# Patient Record
Sex: MALE | Race: WHITE | NOT HISPANIC OR LATINO | Employment: FULL TIME | URBAN - NONMETROPOLITAN AREA
[De-identification: names, ages, dates, MRNs, and addresses within clinical notes are randomized per-mention and may not be internally consistent; named-entity substitution may affect disease eponyms.]

---

## 2017-10-05 ENCOUNTER — TRANSCRIBE ORDERS (OUTPATIENT)
Dept: GENERAL RADIOLOGY | Facility: HOSPITAL | Age: 57
End: 2017-10-05

## 2017-10-05 ENCOUNTER — HOSPITAL ENCOUNTER (OUTPATIENT)
Dept: CT IMAGING | Facility: HOSPITAL | Age: 57
Discharge: HOME | End: 2017-10-05
Admitting: NURSE PRACTITIONER

## 2017-10-05 DIAGNOSIS — R10.9 ABDOMINAL PAIN, UNSPECIFIED ABDOMINAL LOCATION: Primary | ICD-10-CM

## 2017-10-05 DIAGNOSIS — R10.9 ABDOMINAL PAIN, UNSPECIFIED ABDOMINAL LOCATION: ICD-10-CM

## 2017-10-05 LAB — CREAT BLDA-MCNC: 1.2 MG/DL (ref 0.6–1.3)

## 2017-10-05 PROCEDURE — 0 IOPAMIDOL 61 % SOLUTION: Performed by: NURSE PRACTITIONER

## 2017-10-05 PROCEDURE — 74177 CT ABD & PELVIS W/CONTRAST: CPT

## 2017-10-05 PROCEDURE — 82565 ASSAY OF CREATININE: CPT

## 2017-10-05 PROCEDURE — 0 IOHEXOL 300 MG/ML SOLUTION: Performed by: NURSE PRACTITIONER

## 2017-10-05 RX ADMIN — IOHEXOL 50 ML: 300 INJECTION, SOLUTION INTRAVENOUS at 16:30

## 2017-10-05 RX ADMIN — IOPAMIDOL 100 ML: 612 INJECTION, SOLUTION INTRAVENOUS at 16:30

## 2017-10-27 ENCOUNTER — TELEPHONE (OUTPATIENT)
Dept: UROLOGY | Facility: CLINIC | Age: 57
End: 2017-10-27

## 2017-11-02 ENCOUNTER — OFFICE VISIT (OUTPATIENT)
Dept: UROLOGY | Facility: CLINIC | Age: 57
End: 2017-11-02

## 2017-11-02 VITALS — WEIGHT: 210 LBS | TEMPERATURE: 98.7 F | BODY MASS INDEX: 29.4 KG/M2 | HEIGHT: 71 IN

## 2017-11-02 DIAGNOSIS — N28.1 RENAL CYST: Primary | ICD-10-CM

## 2017-11-02 LAB
BILIRUB BLD-MCNC: NEGATIVE MG/DL
CLARITY, POC: CLEAR
COLOR UR: YELLOW
GLUCOSE UR STRIP-MCNC: NEGATIVE MG/DL
KETONES UR QL: NEGATIVE
LEUKOCYTE EST, POC: NEGATIVE
NITRITE UR-MCNC: NEGATIVE MG/ML
PH UR: 5.5 [PH] (ref 5–8)
PROT UR STRIP-MCNC: NEGATIVE MG/DL
RBC # UR STRIP: ABNORMAL /UL
SP GR UR: 1.02 (ref 1–1.03)
UROBILINOGEN UR QL: NORMAL

## 2017-11-02 PROCEDURE — 81003 URINALYSIS AUTO W/O SCOPE: CPT | Performed by: UROLOGY

## 2017-11-02 PROCEDURE — 99203 OFFICE O/P NEW LOW 30 MIN: CPT | Performed by: UROLOGY

## 2017-11-02 RX ORDER — LINACLOTIDE 290 UG/1
CAPSULE, GELATIN COATED ORAL
COMMUNITY
Start: 2017-10-05 | End: 2022-02-07

## 2017-11-02 NOTE — PROGRESS NOTES
"  Mr. Silveira is 57 y.o. male    CHIEF COMPLAINT: I am here because of renal cysts.     HPI  Renal mass  The patient presents with a left renal mass. This was first identified approximately 1 month ago. This was found in the context of an evaluation of an unrelated illness on a CT scan with contrast. This is a new diagnois problem. The onset was unknown. Associated symptoms/signs to consider: no evidence of hematuria, flank pain but he has lower abdominal discomfort with diarrhea.   The following portions of the patient's history were reviewed and updated as appropriate: allergies, current medications, past family history, past medical history, past social history, past surgical history and problem list.    Review of Systems   Constitutional: Negative for chills and fever.   Gastrointestinal: Negative for abdominal pain, anal bleeding and blood in stool.   Genitourinary: Negative for flank pain and hematuria.         Current Outpatient Prescriptions:   •  LINZESS 290 MCG capsule capsule, , Disp: , Rfl:     History reviewed. No pertinent past medical history.    History reviewed. No pertinent surgical history.    Social History     Social History   • Marital status:      Spouse name: N/A   • Number of children: N/A   • Years of education: N/A     Social History Main Topics   • Smoking status: Current Every Day Smoker   • Smokeless tobacco: None   • Alcohol use None   • Drug use: None   • Sexual activity: Not Asked     Other Topics Concern   • None     Social History Narrative       Family History   Problem Relation Age of Onset   • No Known Problems Father    • No Known Problems Mother        Temp 98.7 °F (37.1 °C)  Ht 71\" (180.3 cm)  Wt 210 lb (95.3 kg)  BMI 29.29 kg/m2    Physical Exam  Constitutional: She is oriented to person, place, and time. She appears well-developed and well-nourished. No distress.   HENT:   Head: Normocephalic and atraumatic.   Right Ear: External ear and ear canal normal.   Left " Ear: External ear and ear canal normal.   Nose: No nasal deformity. No epistaxis.   Mouth/Throat: Oropharynx is clear and moist. Mucous membranes are not pale, not dry and not cyanotic. Normal dentition. No oropharyngeal exudate.   Neck: Trachea normal. No tracheal tenderness present. No tracheal deviation present. No thyroid mass and no thyromegaly present.   Pulmonary/Chest: Effort normal. No accessory muscle usage. No respiratory distress. Chest wall is not dull to percussion (No flatness or hyperresonance). She exhibits no mass and no tenderness.   On palpation, no tactile fremitus. All movements are symmetric. No intercostal retraction noted.    Abdominal: Soft. Normal appearance. She exhibits no distension and no mass. There is no hepatosplenomegaly. There is no tenderness. No hernia.   Rectal examination or stool specimen is not indicated.    Musculoskeletal:   Normal gait and station. The spine, ribs, and pelvis are examined. No obvious misalignment or asymmetry. ROM is reasonable for age. No instability. No obvious atrophy, flaccidity or spasticity.    Lymphadenopathy:     She has no cervical adenopathy.        Right: No inguinal adenopathy present.        Left: No inguinal adenopathy present.   Neurological: She is alert and oriented to person, place, and time.   Skin: Skin is warm, dry and intact. No lesion and no rash noted. She is not diaphoretic. No cyanosis. No pallor. Nails show no clubbing.   On palpation, there were no induration, subcutaneous nodules, or tightening   Psychiatric: Her speech is normal and behavior is normal. Judgment and thought content normal. Her mood appears not anxious. Her affect is not labile. She does not exhibit a depressed mood.   Vitals reviewed.      Results for orders placed or performed in visit on 11/02/17   POC Urinalysis Dipstick, Automated   Result Value Ref Range    Color Yellow Yellow, Straw, Dark Yellow, Nelly    Clarity, UA Clear Clear    Glucose, UA Negative  Negative, 1000 mg/dL (3+) mg/dL    Bilirubin Negative Negative    Ketones, UA Negative Negative    Specific Gravity  1.025 1.005 - 1.030    Blood, UA Trace (A) Negative    pH, Urine 5.5 5.0 - 8.0    Protein, POC Negative Negative mg/dL    Urobilinogen, UA Normal Normal    Leukocytes Negative Negative    Nitrite, UA Negative Negative     Independent review of CT scan of the abdomen/pelvis The patient has undergone a CT scan of the abdomen and pelvis With contrast. The images are available for me to review as an independent interpretation for evaluation and management.  Assessment of the renal parenchyma with regards to thickness, scarring, symmetry in appearance and function, presence of masses both pre-and postcontrast, and calcifications are noted.  The collecting system with regard to dilatation, presence of calcifications, and masses were reviewed.  The course and caliber the ureters also noted.  The renal vessels and retroperitoneum is inspected for pathology.  The solid viscera and bowel pattern are briefly reviewed, but will also be inspected by the radiologist. The renal pelvis is inspected.  This study shows A parapelvic renal cyst that measures about 5 cm in size.  It does not appear to obstruct kidney.  Parenchyma appears normal.  This does not enhance..      Assessment and Plan  Diagnoses and all orders for this visit:    Renal cyst  -     POC Urinalysis Dipstick, Automated      Georges Kennedy MD  11/02/17  5:00 PM      Cc: LUIS ALFREDO

## 2018-04-17 ENCOUNTER — HOSPITAL ENCOUNTER (OUTPATIENT)
Dept: ULTRASOUND IMAGING | Facility: HOSPITAL | Age: 58
Discharge: HOME | End: 2018-04-17
Admitting: NURSE PRACTITIONER

## 2018-04-17 ENCOUNTER — LAB (OUTPATIENT)
Dept: LAB | Facility: HOSPITAL | Age: 58
End: 2018-04-17

## 2018-04-17 ENCOUNTER — TRANSCRIBE ORDERS (OUTPATIENT)
Dept: ADMINISTRATIVE | Facility: HOSPITAL | Age: 58
End: 2018-04-17

## 2018-04-17 DIAGNOSIS — R10.10 UPPER ABDOMINAL PAIN: ICD-10-CM

## 2018-04-17 DIAGNOSIS — R10.10 UPPER ABDOMINAL PAIN: Primary | ICD-10-CM

## 2018-04-17 LAB
ALBUMIN SERPL-MCNC: 4 G/DL (ref 3.5–5)
ALBUMIN/GLOB SERPL: 1 G/DL (ref 1.1–2.5)
ALP SERPL-CCNC: 86 U/L (ref 24–120)
ALT SERPL W P-5'-P-CCNC: 21 U/L (ref 0–54)
AMYLASE SERPL-CCNC: 39 U/L (ref 30–110)
ANION GAP SERPL CALCULATED.3IONS-SCNC: 6 MMOL/L (ref 4–13)
AST SERPL-CCNC: 15 U/L (ref 7–45)
AUTO MIXED CELLS #: 1 10*3/MM3 (ref 0.1–2.6)
AUTO MIXED CELLS %: 8.9 % (ref 0.1–24)
BILIRUB SERPL-MCNC: 0.9 MG/DL (ref 0.1–1)
BUN BLD-MCNC: 16 MG/DL (ref 5–21)
BUN/CREAT SERPL: 12.6
CALCIUM SPEC-SCNC: 9.4 MG/DL (ref 8.4–10.4)
CHLORIDE SERPL-SCNC: 105 MMOL/L (ref 98–110)
CO2 SERPL-SCNC: 33 MMOL/L (ref 24–31)
CREAT BLD-MCNC: 1.27 MG/DL (ref 0.5–1.4)
ERYTHROCYTE [DISTWIDTH] IN BLOOD BY AUTOMATED COUNT: 13.1 % (ref 12–15)
GFR SERPL CREATININE-BSD FRML MDRD: 59 ML/MIN/1.73
GLOBULIN UR ELPH-MCNC: 4 GM/DL
GLUCOSE BLD-MCNC: 92 MG/DL (ref 70–100)
HCT VFR BLD AUTO: 44.6 % (ref 40–52)
HGB BLD-MCNC: 15.7 G/DL (ref 14–18)
LYMPHOCYTES # BLD AUTO: 3.2 10*3/MM3 (ref 0.8–7)
LYMPHOCYTES NFR BLD AUTO: 28.6 % (ref 15–45)
MCH RBC QN AUTO: 32 PG (ref 28–32)
MCHC RBC AUTO-ENTMCNC: 35.2 G/DL (ref 33–36)
MCV RBC AUTO: 90.8 FL (ref 82–95)
NEUTROPHILS # BLD AUTO: 6.9 10*3/MM3 (ref 1.5–8.3)
NEUTROPHILS NFR BLD AUTO: 62.5 % (ref 39–78)
PLATELET # BLD AUTO: 279 10*3/MM3 (ref 130–400)
PMV BLD AUTO: 9.8 FL (ref 6–12)
POTASSIUM BLD-SCNC: 4.5 MMOL/L (ref 3.5–5.3)
PROT SERPL-MCNC: 8 G/DL (ref 6.3–8.7)
RBC # BLD AUTO: 4.91 10*6/MM3 (ref 4.2–5.4)
SODIUM BLD-SCNC: 144 MMOL/L (ref 135–145)
WBC NRBC COR # BLD: 11.1 10*3/MM3 (ref 4.8–10.8)

## 2018-04-17 PROCEDURE — 76705 ECHO EXAM OF ABDOMEN: CPT

## 2018-04-17 PROCEDURE — 85025 COMPLETE CBC W/AUTO DIFF WBC: CPT | Performed by: NURSE PRACTITIONER

## 2018-04-17 PROCEDURE — 82150 ASSAY OF AMYLASE: CPT

## 2018-04-17 PROCEDURE — 80053 COMPREHEN METABOLIC PANEL: CPT | Performed by: NURSE PRACTITIONER

## 2018-04-17 PROCEDURE — 36415 COLL VENOUS BLD VENIPUNCTURE: CPT

## 2018-04-17 PROCEDURE — 86677 HELICOBACTER PYLORI ANTIBODY: CPT | Performed by: NURSE PRACTITIONER

## 2018-04-18 LAB
H PYLORI IGA SER IA-ACNC: 15.6 UNITS (ref 0–8.9)
H PYLORI IGG SER IA-ACNC: 6.08 INDEX VALUE (ref 0–0.79)
H PYLORI IGM SER-ACNC: <9 UNITS (ref 0–8.9)

## 2018-05-16 ENCOUNTER — OFFICE VISIT (OUTPATIENT)
Dept: GASTROENTEROLOGY | Age: 58
End: 2018-05-16
Payer: COMMERCIAL

## 2018-05-16 VITALS
WEIGHT: 212.2 LBS | BODY MASS INDEX: 29.71 KG/M2 | DIASTOLIC BLOOD PRESSURE: 78 MMHG | HEIGHT: 71 IN | SYSTOLIC BLOOD PRESSURE: 126 MMHG | OXYGEN SATURATION: 96 % | HEART RATE: 86 BPM

## 2018-05-16 DIAGNOSIS — R10.13 EPIGASTRIC BURNING SENSATION: Primary | ICD-10-CM

## 2018-05-16 DIAGNOSIS — K59.00 CONSTIPATION, UNSPECIFIED CONSTIPATION TYPE: ICD-10-CM

## 2018-05-16 PROCEDURE — 99203 OFFICE O/P NEW LOW 30 MIN: CPT | Performed by: NURSE PRACTITIONER

## 2018-05-16 RX ORDER — PANTOPRAZOLE SODIUM 40 MG/1
40 TABLET, DELAYED RELEASE ORAL DAILY
COMMUNITY

## 2018-05-16 RX ORDER — SUCRALFATE 1 G/1
TABLET ORAL
COMMUNITY
Start: 2018-04-17

## 2018-05-16 ASSESSMENT — ENCOUNTER SYMPTOMS
TROUBLE SWALLOWING: 0
BLOOD IN STOOL: 0
COUGH: 0
ABDOMINAL DISTENTION: 0
SHORTNESS OF BREATH: 0
VOICE CHANGE: 0
ABDOMINAL PAIN: 0
CONSTIPATION: 1
RECTAL PAIN: 0
NAUSEA: 0
SORE THROAT: 0
ANAL BLEEDING: 0
VOMITING: 0
DIARRHEA: 0
BACK PAIN: 0

## 2018-07-02 ENCOUNTER — ANESTHESIA EVENT (OUTPATIENT)
Dept: OPERATING ROOM | Age: 58
End: 2018-07-02

## 2018-07-02 ENCOUNTER — ANESTHESIA (OUTPATIENT)
Dept: OPERATING ROOM | Age: 58
End: 2018-07-02

## 2018-07-02 ENCOUNTER — HOSPITAL ENCOUNTER (OUTPATIENT)
Age: 58
Setting detail: SPECIMEN
Discharge: HOME | End: 2018-07-02
Payer: COMMERCIAL

## 2018-07-02 ENCOUNTER — HOSPITAL ENCOUNTER (OUTPATIENT)
Age: 58
Setting detail: OUTPATIENT SURGERY
Discharge: HOME | End: 2018-07-02
Attending: INTERNAL MEDICINE | Admitting: INTERNAL MEDICINE
Payer: COMMERCIAL

## 2018-07-02 VITALS
WEIGHT: 212 LBS | OXYGEN SATURATION: 95 % | BODY MASS INDEX: 29.68 KG/M2 | RESPIRATION RATE: 18 BRPM | HEIGHT: 71 IN | DIASTOLIC BLOOD PRESSURE: 84 MMHG | SYSTOLIC BLOOD PRESSURE: 142 MMHG | HEART RATE: 73 BPM

## 2018-07-02 VITALS — SYSTOLIC BLOOD PRESSURE: 135 MMHG | OXYGEN SATURATION: 95 % | DIASTOLIC BLOOD PRESSURE: 91 MMHG

## 2018-07-02 PROCEDURE — 87077 CULTURE AEROBIC IDENTIFY: CPT

## 2018-07-02 PROCEDURE — 88305 TISSUE EXAM BY PATHOLOGIST: CPT

## 2018-07-02 PROCEDURE — 45380 COLONOSCOPY AND BIOPSY: CPT

## 2018-07-02 PROCEDURE — G8918 PT W/O PREOP ORDER IV AB PRO: HCPCS

## 2018-07-02 PROCEDURE — 43239 EGD BIOPSY SINGLE/MULTIPLE: CPT

## 2018-07-02 PROCEDURE — 45380 COLONOSCOPY AND BIOPSY: CPT | Performed by: INTERNAL MEDICINE

## 2018-07-02 PROCEDURE — G8907 PT DOC NO EVENTS ON DISCHARG: HCPCS

## 2018-07-02 PROCEDURE — 43239 EGD BIOPSY SINGLE/MULTIPLE: CPT | Performed by: INTERNAL MEDICINE

## 2018-07-02 RX ORDER — PROPOFOL 10 MG/ML
INJECTION, EMULSION INTRAVENOUS PRN
Status: DISCONTINUED | OUTPATIENT
Start: 2018-07-02 | End: 2018-07-02 | Stop reason: SDUPTHER

## 2018-07-02 RX ORDER — LIDOCAINE HYDROCHLORIDE 10 MG/ML
1 INJECTION, SOLUTION EPIDURAL; INFILTRATION; INTRACAUDAL; PERINEURAL
Status: DISCONTINUED | OUTPATIENT
Start: 2018-07-02 | End: 2018-07-02 | Stop reason: HOSPADM

## 2018-07-02 RX ORDER — SODIUM CHLORIDE 9 MG/ML
INJECTION, SOLUTION INTRAVENOUS CONTINUOUS
Status: DISCONTINUED | OUTPATIENT
Start: 2018-07-02 | End: 2018-07-02 | Stop reason: HOSPADM

## 2018-07-02 RX ADMIN — PROPOFOL 300 MG: 10 INJECTION, EMULSION INTRAVENOUS at 11:25

## 2018-07-02 RX ADMIN — SODIUM CHLORIDE: 9 INJECTION, SOLUTION INTRAVENOUS at 11:09

## 2018-07-02 NOTE — OP NOTE
Post Procedure Note    Name of surgeon / : Luann Miner DO    Date of Service: 07/02/18    Withdrawal Time: >6min    Prep Quality: good     Pre-operative Diagnosis:   Active Hospital Problems    Diagnosis Date Noted    Epigastric burning sensation [R10.13] 05/16/2018    Constipation [K59.00] 05/16/2018       Post-operative Diagnosis/Findings: colon polyps    Procedure: Procedure(s):  COLONOSCOPY WITH cold BIOPSY polypectomy  EGD BIOPSY    Anesthesia: Monitor Anesthesia Care    Surgeons/Assistants: Luann Miner DO    Referring Physician: CASEY Doss -*    Procedure Note:  After informed consent was obtained, the patient was placed in the left lateral decubitus position and sedated per MAC. A rectal exam was done which was normal.  The colonoscope was inserted into the rectum and retroflexed which was normal.  The colonoscope was then advanced to the cecum under direct visualization. The appendiceal orifice and ileocecal valve were identified. The colonoscope was withdrawn. A polyp was seen in the sigmoid. It was dimunitive in size. It was removed via removed by cold biopsy and sent for pathology. No abnormalities were discovered. The colonoscope was completely withdrawn. The patient tolerated the procedure. The patient was then positioned for an upper endoscopy. The endoscope was advanced down the oropharynx, down the esophagus, through the gastric lumen, into the duodenum. The small bowel appeared normal.  The gastric antrum was normal.  Antral biopsies were obtained for h. Pylori. The gastric body was normal.  Retroflexion was done which showed a normal fundus. The scope was withdrawn. The GE junction was at 44 cm. It was slightly jagged and irregular. Biopsies were obtained. The remaining esophagus was normal.  The scope was withdrawn.         Estimated Blood Loss: Minimal    Complications: None    Specimens:   ID Type Source Tests Collected by Time Destination   1 : ANT TEST Tissue Stomach ANT TEST Benjamin Diallo, DO 7/2/2018 1141    A : sigmoid colon polyp x2 Tissue Sigmoid Colon SURGICAL PATHOLOGY Benjamin Diallo, DO 7/2/2018 1137    B : distal esophagus bx ro barretts Tissue Esophagus SURGICAL PATHOLOGY Benjamin Diallo, DO 7/2/2018 1141        Discussion: The patient had colon polyps. Repeat colonoscopy in 5 years. If he is positive for h. Pylori, we will treat. Ok to continue linzess and protonix. Ok to stop carafate and only take as needed.     Benjamin Diallo, DO  07/02/18  11:45 AM

## 2018-07-02 NOTE — ANESTHESIA POSTPROCEDURE EVALUATION
Department of Anesthesiology  Postprocedure Note    Patient: Ronnie Eastman  MRN: [id number]  YOB: 1960  Date of evaluation: 7/2/2018  Time:  11:42 AM     Procedure Summary     Date:  07/02/18 Room / Location:  [facility] ENDO 01 / [facility] OR    Anesthesia Start:  0901 Anesthesia Stop:      Procedures:       COLONOSCOPY WITH BIOPSY (N/A )      EGD BIOPSY (N/A ) Diagnosis:  (NAUSEA - BLOATING - UPPER ABD BURNING - CONSTIPATION )    Surgeon:  Katerine Lovett DO Responsible Provider: CASEY Kong CRNA    Anesthesia Type:  MAC ASA Status:  2          Anesthesia Type: MAC    Dwayne Phase I:      Dwayne Phase II:      Last vitals: Reviewed and per EMR flowsheets.        Anesthesia Post Evaluation    Patient location during evaluation: bedside  Patient participation: complete - patient participated  Level of consciousness: sleepy but conscious  Airway patency: patent  Nausea & Vomiting: no nausea and no vomiting  Complications: no  Cardiovascular status: hemodynamically stable  Respiratory status: room air and spontaneous ventilation  Hydration status: euvolemic

## 2018-07-02 NOTE — H&P
Patient Name: Qi Lu  : 1960  MRN: [id number]    Allergies: No Known Allergies      ENDOSCOPY / COLONOSCOPY / BRONCHOSCOPY      PRE-SEDATION ASSESSMENT      Procedure:    [x] Colonoscopy     [x] Endoscopy      [] ERCP      [] Bronchoscopy      [] Other  [] History and Physical completed in chart for Inpatient or within 30 daysfrom office. I have examined the patient's status immediately prior to the procedure and:    [x] No interval change in patient status since H&P completed  [] Interval change in patient status (explained below)           BRIEF H&P    HPI/changes/indicators/diagnosis  Active Hospital Problems    Diagnosis Date Noted    Epigastric burning sensation [R10.13] 2018    Constipation [K59.00] 2018       Medications:   Prior to Admission medications    Medication Sig Start Date End Date Taking? Authorizing Provider   sucralfate (CARAFATE) 1 GM tablet  18   Historical Provider, MD   linaclotide [facility]) 290 MCG CAPS capsule  10/5/17   Historical Provider, MD   pantoprazole (PROTONIX) 40 MG tablet Take 40 mg by mouth daily    Historical Provider, MD       Allergies:   has No Known Allergies. Vital Signs:   Vitals:    18 1057   BP: (!) 171/102   Pulse: 79   Resp: 18   SpO2: 98%       ROS:  Cardiac:  [x]WNL  []Comments:  Pulmonary:  [x]WNL   []Comments:  Neuro/Mental Status:  [x]WNL  []Comments:  Abdominal:                   Active Hospital Problems    Diagnosis Date Noted    Epigastric burning sensation [R10.13] 2018    Constipation [K59.00] 2018        All other pertinent GI symptoms negative.     Physical Exam:  Cardiac:  [x]WNL  []Comments:  Pulmonary:  [x]WNL   []Comments:  Neuro/Mental Status:  [x]WNL  []Comments:  Abdominal:  [x]WNL    []Comments:  Other:   []WNL  []Comments:    Informed Consent:  The risks and benefits of the procedure have been discussed with either the patient or if they cannot consent, their

## 2018-07-03 LAB — CLOTEST: NEGATIVE

## 2018-07-17 ENCOUNTER — TELEPHONE (OUTPATIENT)
Dept: GASTROENTEROLOGY | Age: 58
End: 2018-07-17

## 2019-05-24 ENCOUNTER — TELEPHONE (OUTPATIENT)
Dept: FAMILY MEDICINE CLINIC | Facility: CLINIC | Age: 59
End: 2019-05-24

## 2022-02-07 ENCOUNTER — LAB (OUTPATIENT)
Dept: LAB | Facility: HOSPITAL | Age: 62
End: 2022-02-07

## 2022-02-07 ENCOUNTER — OFFICE VISIT (OUTPATIENT)
Dept: FAMILY MEDICINE CLINIC | Facility: CLINIC | Age: 62
End: 2022-02-07

## 2022-02-07 ENCOUNTER — APPOINTMENT (OUTPATIENT)
Dept: CT IMAGING | Facility: HOSPITAL | Age: 62
End: 2022-02-07

## 2022-02-07 ENCOUNTER — HOSPITAL ENCOUNTER (EMERGENCY)
Facility: HOSPITAL | Age: 62
Discharge: HOME | End: 2022-02-07
Admitting: STUDENT IN AN ORGANIZED HEALTH CARE EDUCATION/TRAINING PROGRAM

## 2022-02-07 ENCOUNTER — APPOINTMENT (OUTPATIENT)
Dept: GENERAL RADIOLOGY | Facility: HOSPITAL | Age: 62
End: 2022-02-07

## 2022-02-07 ENCOUNTER — HOSPITAL ENCOUNTER (OUTPATIENT)
Dept: CT IMAGING | Facility: HOSPITAL | Age: 62
Discharge: HOME | End: 2022-02-07

## 2022-02-07 ENCOUNTER — TELEPHONE (OUTPATIENT)
Dept: FAMILY MEDICINE CLINIC | Facility: CLINIC | Age: 62
End: 2022-02-07

## 2022-02-07 VITALS
HEART RATE: 99 BPM | RESPIRATION RATE: 22 BRPM | WEIGHT: 216 LBS | DIASTOLIC BLOOD PRESSURE: 78 MMHG | BODY MASS INDEX: 30.24 KG/M2 | TEMPERATURE: 98 F | SYSTOLIC BLOOD PRESSURE: 128 MMHG | OXYGEN SATURATION: 94 % | HEIGHT: 71 IN

## 2022-02-07 VITALS
BODY MASS INDEX: 30.24 KG/M2 | WEIGHT: 216 LBS | DIASTOLIC BLOOD PRESSURE: 94 MMHG | TEMPERATURE: 99.6 F | OXYGEN SATURATION: 96 % | HEART RATE: 103 BPM | RESPIRATION RATE: 16 BRPM | SYSTOLIC BLOOD PRESSURE: 167 MMHG | HEIGHT: 71 IN

## 2022-02-07 DIAGNOSIS — R35.0 URINARY FREQUENCY: ICD-10-CM

## 2022-02-07 DIAGNOSIS — R10.9 LEFT FLANK PAIN: ICD-10-CM

## 2022-02-07 DIAGNOSIS — R39.15 URINARY URGENCY: ICD-10-CM

## 2022-02-07 DIAGNOSIS — R93.89 ABNORMAL CT SCAN: ICD-10-CM

## 2022-02-07 DIAGNOSIS — U07.1 COVID-19: Primary | ICD-10-CM

## 2022-02-07 DIAGNOSIS — R74.8 ELEVATED LIVER ENZYMES: ICD-10-CM

## 2022-02-07 DIAGNOSIS — J12.82 PNEUMONIA DUE TO COVID-19 VIRUS: ICD-10-CM

## 2022-02-07 DIAGNOSIS — U07.1 PNEUMONIA DUE TO COVID-19 VIRUS: ICD-10-CM

## 2022-02-07 DIAGNOSIS — E87.1 HYPONATREMIA: ICD-10-CM

## 2022-02-07 DIAGNOSIS — R10.9 LEFT FLANK PAIN: Primary | ICD-10-CM

## 2022-02-07 DIAGNOSIS — E83.51 HYPOCALCEMIA: ICD-10-CM

## 2022-02-07 DIAGNOSIS — N28.1 RENAL CYST, LEFT: ICD-10-CM

## 2022-02-07 LAB
ALBUMIN SERPL-MCNC: 3.5 G/DL (ref 3.5–5.2)
ALBUMIN SERPL-MCNC: 4 G/DL (ref 3.5–5)
ALBUMIN/GLOB SERPL: 0.9 G/DL
ALBUMIN/GLOB SERPL: 1 G/DL (ref 1.1–2.5)
ALP SERPL-CCNC: 79 U/L (ref 24–120)
ALP SERPL-CCNC: 80 U/L (ref 39–117)
ALT SERPL W P-5'-P-CCNC: 52 U/L (ref 1–41)
ALT SERPL W P-5'-P-CCNC: 62 U/L (ref 0–50)
AMYLASE SERPL-CCNC: 36 U/L (ref 30–110)
ANION GAP SERPL CALCULATED.3IONS-SCNC: 11 MMOL/L (ref 5–15)
ANION GAP SERPL CALCULATED.3IONS-SCNC: 7 MMOL/L (ref 4–13)
ARTERIAL PATENCY WRIST A: ABNORMAL
AST SERPL-CCNC: 70 U/L (ref 1–40)
AST SERPL-CCNC: 84 U/L (ref 7–45)
ATMOSPHERIC PRESS: 757 MMHG
AUTO MIXED CELLS #: 1.2 10*3/MM3 (ref 0.1–2.6)
AUTO MIXED CELLS %: 17 % (ref 0.1–24)
BACTERIA UR QL AUTO: ABNORMAL /HPF
BASE EXCESS BLDA CALC-SCNC: 1.6 MMOL/L (ref 0–2)
BDY SITE: ABNORMAL
BILIRUB SERPL-MCNC: 0.9 MG/DL (ref 0–1.2)
BILIRUB SERPL-MCNC: 1.3 MG/DL (ref 0.1–1)
BILIRUB UR QL STRIP: NEGATIVE
BODY TEMPERATURE: 37 C
BUN SERPL-MCNC: 10 MG/DL (ref 5–21)
BUN SERPL-MCNC: 10 MG/DL (ref 6–20)
BUN/CREAT SERPL: 10.2 (ref 7–25)
BUN/CREAT SERPL: 9.5
BURR CELLS BLD QL SMEAR: ABNORMAL
CALCIUM SPEC-SCNC: 8.3 MG/DL (ref 8.4–10.4)
CALCIUM SPEC-SCNC: 8.4 MG/DL (ref 8.6–10.5)
CHLORIDE SERPL-SCNC: 95 MMOL/L (ref 98–107)
CHLORIDE SERPL-SCNC: 95 MMOL/L (ref 98–110)
CLARITY UR: CLEAR
CO2 SERPL-SCNC: 26 MMOL/L (ref 22–29)
CO2 SERPL-SCNC: 27 MMOL/L (ref 24–31)
COLOR UR: YELLOW
CREAT SERPL-MCNC: 0.98 MG/DL (ref 0.76–1.27)
CREAT SERPL-MCNC: 1.05 MG/DL (ref 0.5–1.4)
CRP SERPL-MCNC: 12.21 MG/DL (ref 0–0.5)
CRP SERPL-MCNC: 12.28 MG/DL (ref 0–0.5)
D DIMER PPP FEU-MCNC: 0.84 MG/L (FEU) (ref 0–0.5)
D-LACTATE SERPL-SCNC: 1 MMOL/L (ref 0.5–2)
DEPRECATED RDW RBC AUTO: 40.4 FL (ref 37–54)
ERYTHROCYTE [DISTWIDTH] IN BLOOD BY AUTOMATED COUNT: 12.6 % (ref 12.3–15.4)
ERYTHROCYTE [DISTWIDTH] IN BLOOD BY AUTOMATED COUNT: 12.7 % (ref 12.3–15.4)
ERYTHROCYTE [SEDIMENTATION RATE] IN BLOOD: 15 MM/HR (ref 0–20)
GFR SERPL CREATININE-BSD FRML MDRD: 72 ML/MIN/1.73
GFR SERPL CREATININE-BSD FRML MDRD: 78 ML/MIN/1.73
GIANT PLATELETS: ABNORMAL
GLOBULIN UR ELPH-MCNC: 3.9 GM/DL
GLOBULIN UR ELPH-MCNC: 4.1 GM/DL
GLUCOSE SERPL-MCNC: 107 MG/DL (ref 65–99)
GLUCOSE SERPL-MCNC: 108 MG/DL (ref 70–100)
GLUCOSE UR STRIP-MCNC: NEGATIVE MG/DL
HCO3 BLDA-SCNC: 24.2 MMOL/L (ref 20–26)
HCT VFR BLD AUTO: 43.4 % (ref 37.5–51)
HCT VFR BLD AUTO: 43.9 % (ref 37.5–51)
HGB BLD-MCNC: 14.9 G/DL (ref 13–17.7)
HGB BLD-MCNC: 15.3 G/DL (ref 13–17.7)
HGB UR QL STRIP.AUTO: ABNORMAL
HOLD SPECIMEN: NORMAL
HYALINE CASTS UR QL AUTO: ABNORMAL /LPF
INHALED O2 CONCENTRATION: 21 %
KETONES UR QL STRIP: ABNORMAL
LDH SERPL-CCNC: 484 U/L (ref 135–225)
LEUKOCYTE ESTERASE UR QL STRIP.AUTO: NEGATIVE
LIPASE SERPL-CCNC: 45 U/L (ref 23–203)
LYMPHOCYTES # BLD AUTO: 1.1 10*3/MM3 (ref 0.7–3.1)
LYMPHOCYTES # BLD MANUAL: 0.59 10*3/MM3 (ref 0.7–3.1)
LYMPHOCYTES NFR BLD AUTO: 15.6 % (ref 19.6–45.3)
LYMPHOCYTES NFR BLD MANUAL: 11 % (ref 5–12)
Lab: ABNORMAL
MCH RBC QN AUTO: 30 PG (ref 26.6–33)
MCH RBC QN AUTO: 30.6 PG (ref 26.6–33)
MCHC RBC AUTO-ENTMCNC: 34.3 G/DL (ref 31.5–35.7)
MCHC RBC AUTO-ENTMCNC: 34.9 G/DL (ref 31.5–35.7)
MCV RBC AUTO: 87.3 FL (ref 79–97)
MCV RBC AUTO: 87.8 FL (ref 79–97)
METAMYELOCYTES NFR BLD MANUAL: 2 % (ref 0–0)
MODALITY: ABNORMAL
MONOCYTES # BLD: 0.72 10*3/MM3 (ref 0.1–0.9)
NEUTROPHILS # BLD AUTO: 5.12 10*3/MM3 (ref 1.7–7)
NEUTROPHILS NFR BLD AUTO: 4.7 10*3/MM3 (ref 1.7–7)
NEUTROPHILS NFR BLD AUTO: 67.4 % (ref 42.7–76)
NEUTROPHILS NFR BLD MANUAL: 78 % (ref 42.7–76)
NITRITE UR QL STRIP: NEGATIVE
PCO2 BLDA: 31.8 MM HG (ref 35–45)
PCO2 TEMP ADJ BLD: 31.8 MM HG (ref 35–45)
PH BLDA: 7.49 PH UNITS (ref 7.35–7.45)
PH UR STRIP.AUTO: 6 [PH] (ref 5–8)
PH, TEMP CORRECTED: 7.49 PH UNITS (ref 7.35–7.45)
PLATELET # BLD AUTO: 202 10*3/MM3 (ref 140–450)
PLATELET # BLD AUTO: 218 10*3/MM3 (ref 140–450)
PMV BLD AUTO: 10.8 FL (ref 6–12)
PMV BLD AUTO: 9.6 FL (ref 6–12)
PO2 BLDA: 59 MM HG (ref 83–108)
PO2 TEMP ADJ BLD: 59 MM HG (ref 83–108)
POIKILOCYTOSIS BLD QL SMEAR: ABNORMAL
POLYCHROMASIA BLD QL SMEAR: ABNORMAL
POTASSIUM SERPL-SCNC: 3.6 MMOL/L (ref 3.5–5.2)
POTASSIUM SERPL-SCNC: 3.7 MMOL/L (ref 3.5–5.3)
PROCALCITONIN SERPL-MCNC: 0.1 NG/ML (ref 0–0.25)
PROT SERPL-MCNC: 7.6 G/DL (ref 6–8.5)
PROT SERPL-MCNC: 7.9 G/DL (ref 6.3–8.7)
PROT UR QL STRIP: ABNORMAL
RBC # BLD AUTO: 4.97 10*6/MM3 (ref 4.14–5.8)
RBC # BLD AUTO: 5 10*6/MM3 (ref 4.14–5.8)
RBC # UR STRIP: ABNORMAL /HPF
REF LAB TEST METHOD: ABNORMAL
SAO2 % BLDCOA: 92.6 % (ref 94–99)
SARS-COV-2 RNA PNL SPEC NAA+PROBE: DETECTED
SODIUM SERPL-SCNC: 129 MMOL/L (ref 135–145)
SODIUM SERPL-SCNC: 132 MMOL/L (ref 136–145)
SP GR UR STRIP: 1.01 (ref 1–1.03)
SQUAMOUS #/AREA URNS HPF: ABNORMAL /HPF
UROBILINOGEN UR QL STRIP: ABNORMAL
VARIANT LYMPHS NFR BLD MANUAL: 4 % (ref 0–5)
VARIANT LYMPHS NFR BLD MANUAL: 5 % (ref 19.6–45.3)
VENTILATOR MODE: ABNORMAL
WBC # UR STRIP: ABNORMAL /HPF
WBC MORPH BLD: NORMAL
WBC NRBC COR # BLD: 6.57 10*3/MM3 (ref 3.4–10.8)
WBC NRBC COR # BLD: 7 10*3/MM3 (ref 3.4–10.8)

## 2022-02-07 PROCEDURE — 80053 COMPREHEN METABOLIC PANEL: CPT | Performed by: PHYSICIAN ASSISTANT

## 2022-02-07 PROCEDURE — 85025 COMPLETE CBC W/AUTO DIFF WBC: CPT | Performed by: PHYSICIAN ASSISTANT

## 2022-02-07 PROCEDURE — 86140 C-REACTIVE PROTEIN: CPT | Performed by: PHYSICIAN ASSISTANT

## 2022-02-07 PROCEDURE — 36600 WITHDRAWAL OF ARTERIAL BLOOD: CPT

## 2022-02-07 PROCEDURE — 83615 LACTATE (LD) (LDH) ENZYME: CPT | Performed by: PHYSICIAN ASSISTANT

## 2022-02-07 PROCEDURE — 82150 ASSAY OF AMYLASE: CPT

## 2022-02-07 PROCEDURE — 81001 URINALYSIS AUTO W/SCOPE: CPT | Performed by: NURSE PRACTITIONER

## 2022-02-07 PROCEDURE — 36415 COLL VENOUS BLD VENIPUNCTURE: CPT

## 2022-02-07 PROCEDURE — 71275 CT ANGIOGRAPHY CHEST: CPT

## 2022-02-07 PROCEDURE — 85652 RBC SED RATE AUTOMATED: CPT

## 2022-02-07 PROCEDURE — 86140 C-REACTIVE PROTEIN: CPT

## 2022-02-07 PROCEDURE — 85007 BL SMEAR W/DIFF WBC COUNT: CPT | Performed by: PHYSICIAN ASSISTANT

## 2022-02-07 PROCEDURE — 84145 PROCALCITONIN (PCT): CPT | Performed by: PHYSICIAN ASSISTANT

## 2022-02-07 PROCEDURE — 85379 FIBRIN DEGRADATION QUANT: CPT | Performed by: PHYSICIAN ASSISTANT

## 2022-02-07 PROCEDURE — 83690 ASSAY OF LIPASE: CPT

## 2022-02-07 PROCEDURE — 85025 COMPLETE CBC W/AUTO DIFF WBC: CPT | Performed by: NURSE PRACTITIONER

## 2022-02-07 PROCEDURE — 71045 X-RAY EXAM CHEST 1 VIEW: CPT

## 2022-02-07 PROCEDURE — 80053 COMPREHEN METABOLIC PANEL: CPT | Performed by: NURSE PRACTITIONER

## 2022-02-07 PROCEDURE — 0 IOPAMIDOL PER 1 ML: Performed by: PHYSICIAN ASSISTANT

## 2022-02-07 PROCEDURE — 83605 ASSAY OF LACTIC ACID: CPT | Performed by: PHYSICIAN ASSISTANT

## 2022-02-07 PROCEDURE — 99205 OFFICE O/P NEW HI 60 MIN: CPT | Performed by: NURSE PRACTITIONER

## 2022-02-07 PROCEDURE — 99284 EMERGENCY DEPT VISIT MOD MDM: CPT

## 2022-02-07 PROCEDURE — 96374 THER/PROPH/DIAG INJ IV PUSH: CPT

## 2022-02-07 PROCEDURE — 25010000002 ONDANSETRON PER 1 MG: Performed by: PHYSICIAN ASSISTANT

## 2022-02-07 PROCEDURE — 87635 SARS-COV-2 COVID-19 AMP PRB: CPT | Performed by: NURSE PRACTITIONER

## 2022-02-07 PROCEDURE — 74176 CT ABD & PELVIS W/O CONTRAST: CPT

## 2022-02-07 PROCEDURE — 82803 BLOOD GASES ANY COMBINATION: CPT

## 2022-02-07 RX ORDER — AZITHROMYCIN 250 MG/1
TABLET, FILM COATED ORAL
Qty: 6 TABLET | Refills: 0 | Status: SHIPPED | OUTPATIENT
Start: 2022-02-07

## 2022-02-07 RX ORDER — ONDANSETRON 2 MG/ML
4 INJECTION INTRAMUSCULAR; INTRAVENOUS ONCE
Status: COMPLETED | OUTPATIENT
Start: 2022-02-07 | End: 2022-02-07

## 2022-02-07 RX ORDER — BROMPHENIRAMINE MALEATE, PSEUDOEPHEDRINE HYDROCHLORIDE, AND DEXTROMETHORPHAN HYDROBROMIDE 2; 30; 10 MG/5ML; MG/5ML; MG/5ML
5 SYRUP ORAL 4 TIMES DAILY PRN
Qty: 118 ML | Refills: 0 | Status: SHIPPED | OUTPATIENT
Start: 2022-02-07

## 2022-02-07 RX ORDER — SODIUM CHLORIDE 0.9 % (FLUSH) 0.9 %
10 SYRINGE (ML) INJECTION AS NEEDED
Status: DISCONTINUED | OUTPATIENT
Start: 2022-02-07 | End: 2022-02-07 | Stop reason: HOSPADM

## 2022-02-07 RX ORDER — ACETAMINOPHEN 500 MG
1000 TABLET ORAL ONCE
Status: COMPLETED | OUTPATIENT
Start: 2022-02-07 | End: 2022-02-07

## 2022-02-07 RX ORDER — METHYLPREDNISOLONE 4 MG/1
TABLET ORAL
Qty: 21 EACH | Refills: 0 | Status: SHIPPED | OUTPATIENT
Start: 2022-02-07

## 2022-02-07 RX ADMIN — ONDANSETRON 4 MG: 2 INJECTION INTRAMUSCULAR; INTRAVENOUS at 16:47

## 2022-02-07 RX ADMIN — SODIUM CHLORIDE 1000 ML: 9 INJECTION, SOLUTION INTRAVENOUS at 16:46

## 2022-02-07 RX ADMIN — ACETAMINOPHEN 1000 MG: 500 TABLET ORAL at 16:48

## 2022-02-07 RX ADMIN — IOPAMIDOL 100 ML: 755 INJECTION, SOLUTION INTRAVENOUS at 18:29

## 2022-02-07 NOTE — PROGRESS NOTES
"Chief Complaint  Back Pain (He states he has pain in his left lower back for about 2 days.  He states he thinks he has a UTI.  He states he has frequency but very little urination.)    Subjective    History of Present Illness      Patient presents to [facility] for    Back Pain.  He states he has pain in his left lower back for about 2 days. He states he thinks he has a UTI. He states he has frequency but very little urination        Review of Systems   Genitourinary: Positive for difficulty urinating and frequency.   Musculoskeletal: Positive for back pain.       I have reviewed and agree with the HPI and ROS information as above.  Manjula Mead, DAIANA     Objective   Vital Signs:   /94 (BP Location: Right arm, Patient Position: Sitting)   Pulse 103   Temp 99.6 °F (37.6 °C)   Resp 16   Ht 180.3 cm (71\")   Wt 98 kg (216 lb)   SpO2 96%   BMI 30.13 kg/m²       Physical Exam  Constitutional:       Appearance: Normal appearance. He is well-developed.   HENT:      Head: Normocephalic and atraumatic.      Right Ear: External ear normal.      Left Ear: External ear normal.      Nose: Nose normal. No nasal tenderness or congestion.      Mouth/Throat:      Lips: Pink. No lesions.      Mouth: Mucous membranes are moist. No oral lesions.      Dentition: Normal dentition.      Pharynx: Oropharynx is clear. No pharyngeal swelling, oropharyngeal exudate or posterior oropharyngeal erythema.   Eyes:      General: Lids are normal. Vision grossly intact. No scleral icterus.        Right eye: No discharge.         Left eye: No discharge.      Extraocular Movements: Extraocular movements intact.      Conjunctiva/sclera: Conjunctivae normal.      Right eye: Right conjunctiva is not injected.      Left eye: Left conjunctiva is not injected.      Pupils: Pupils are equal, round, and reactive to light.   Cardiovascular:      Rate and Rhythm: Normal rate and regular rhythm.      Heart " sounds: Normal heart sounds. No murmur heard.  No gallop.    Pulmonary:      Effort: Pulmonary effort is normal.      Breath sounds: Normal breath sounds and air entry. No wheezing, rhonchi or rales.   Abdominal:      General: Bowel sounds are normal.      Palpations: Abdomen is soft.      Tenderness: There is left CVA tenderness.   Musculoskeletal:         General: No tenderness or deformity. Normal range of motion.      Cervical back: Full passive range of motion without pain, normal range of motion and neck supple.      Right lower leg: No edema.      Left lower leg: No edema.   Skin:     General: Skin is warm and dry.      Coloration: Skin is not jaundiced.      Findings: No rash.   Neurological:      Mental Status: He is alert and oriented to person, place, and time.      Cranial Nerves: Cranial nerves are intact.      Sensory: Sensation is intact.      Motor: Motor function is intact.      Coordination: Coordination is intact.      Gait: Gait is intact.   Psychiatric:         Attention and Perception: Attention normal.         Mood and Affect: Mood and affect normal.         Behavior: Behavior is not hyperactive. Behavior is cooperative.         Thought Content: Thought content normal.         Judgment: Judgment normal.          Result Review  Data Reviewed:                   Assessment and Plan      Problem List Items Addressed This Visit     None      Visit Diagnoses     Left flank pain    -  Primary    Relevant Orders    CT Abdomen Pelvis Without Contrast (Completed)    Amylase (Completed)    Lipase (Completed)    CBC & Differential (Completed)    Comprehensive Metabolic Panel (Completed)    C-reactive Protein    Sedimentation Rate (Completed)    Urinalysis With Culture If Indicated - Urine, Clean Catch (Completed)    Urinalysis, Microscopic Only - Urine, Clean Catch (Completed)    Urinary urgency        Urinary frequency        Abnormal CT scan        Relevant Orders    COVID PRE-OP / PRE-PROCEDURE  SCREENING ORDER (NO ISOLATION) - Swab, Nasal Cavity    Hyponatremia        Hypocalcemia        Elevated liver enzymes        Renal cyst, left          Patient comes in today complaining of left lower back pain as well as urinary urgency and urinary frequency.  This has been going on for 2 to 3 days.  He denies a history of kidney stones.  He states he is having normal bowel movements.  Denies noticing any blood in his urine.  I did notice in his chart that he has a history of following with urology it looks like on one visit regarding a renal cyst.  He has not followed up since that visit in 2017.  Patient states he was not told to follow-up in regards to this at that time.    We will proceed with a CT today as well as a urinalysis and blood work.  Pending those results will determine further work-up.    Patient CT shows subtle new subpleural groundglass opacities in the lung bases as well as bibasilar atelectasis and emphysematous changes.  Radiologist recommended correlation with COVID-19 testing so I Bob was collected for that.  Also mild dilation of collecting structure to the left kidney was noted as well as a stable 5.9 cm cyst.    Amylase, lipase, and sed rate were all normal.  CRP is pending.  Urine showed trace ketones and trace protein as well as small blood and a 4 urobilinogen.  CBC okay.  CMP showed a low sodium of 129.  Also show calcium that was mildly low at 8.3.  ALT and AST both elevated.    When I went in to discuss all of the above results with patient he was still in considerable pain.  He is one that would not go to the doctor on a regular basis so he is obviously very uncomfortable today.  I am concerned with the unsure nature of his pain as well as his low sodium level that if he were to go home without further evaluation and treatment at this time that he would get worse.  I called report to a PA at the ER.  Patient wife is going to take patient by private vehicle.  I did educate the  patient as well as the PA that we do have a pending Covid test that should be available in about 30 minutes.        Follow Up   Return if symptoms worsen or fail to improve.  Patient was given instructions and counseling regarding his condition or for health maintenance advice. Please see specific information pulled into the AVS if appropriate.

## 2022-02-07 NOTE — TELEPHONE ENCOUNTER
----- Message from DAIANA Basurto sent at 2/7/2022  4:18 PM CST -----  Pt positive for covid. Please call Er and pt to let them know.     Contacted ER and gave results to provider.

## 2022-02-07 NOTE — ED PROVIDER NOTES
Subjective   History of Present Illness    Patient is a 61-year-old male presenting to ED from PCP with abnormal imaging and flank pain.  Patient states 2 days ago he developed a pain in his lower back which is notable on the left side and began radiating around to the left lateral side for which she went and saw his primary care provider today.  Patient states he did a Covid swab and obtain imaging and advised that he go to the ER however he was unaware of why.  Patient denies any fevers, chills, diaphoresis, upper abdominal pain, nausea, vomiting, diarrhea, dysuria, hematuria, testicular pain/scrotal swelling.  Patient reports he does have a history of a left-sided renal cyst for which his family provider states no change on his imaging today.  Patient denies any medication use but reports that the pain has been continuing to increase.  Patient denies any known sick contact, does not work with the public, did not receive any of the COVID vaccinations.    Records reviewed show no previous ED visits.    Patient last seen at the family care provider earlier today where he was diagnosed with left leg pain, urinary urgency, urinary frequency, abnormal CT, hyponatremia, hypocalcemia, elevated LFTs, left renal cyst. CT showed: Subtle new subpleural groundglass opacities in the lung bases as well as bibasilar atelectasis and emphysematous changes correlate for COVID-19.  Mild dilation collecting structures of left kidney but no apparent decompression of the left renal pelvis and ureter, no obstructing stones, stable 5.9 centimeter cyst within the hilum of the left kidney inferiorly.  Covid testing positive.  Urinalysis with 4 urobilinogen but no further evidence of infection or hematuria.  CMP with hyponatremia 129, hypocalcemia 8.3, slightly elevated LFTs with ALT 62, AST 84, total bilirubin 1.3.      Review of Systems   Constitutional: Negative.  Negative for chills, diaphoresis and fever.   HENT: Negative.    Eyes:  Negative.    Respiratory: Negative.  Negative for cough and shortness of breath.    Cardiovascular: Negative.  Negative for chest pain.   Gastrointestinal: Positive for abdominal pain (LLQ). Negative for nausea and vomiting.   Genitourinary: Positive for flank pain (left). Negative for dysuria, hematuria, scrotal swelling and testicular pain.   Skin: Negative.    Neurological: Negative.    Psychiatric/Behavioral: Negative.    All other systems reviewed and are negative.      No past medical history on file.    No Known Allergies    No past surgical history on file.    Family History   Problem Relation Age of Onset   • No Known Problems Father    • No Known Problems Mother        Social History     Socioeconomic History   • Marital status:    Tobacco Use   • Smoking status: Current Every Day Smoker   • Smokeless tobacco: Never Used   Vaping Use   • Vaping Use: Never used           Objective   Physical Exam  Vitals and nursing note reviewed.   Constitutional:       General: He is not in acute distress.     Appearance: Normal appearance. He is well-developed, well-groomed and overweight. He is ill-appearing. He is not toxic-appearing or diaphoretic.   HENT:      Head: Normocephalic.      Mouth/Throat:      Mouth: Mucous membranes are moist.      Pharynx: Oropharynx is clear. No posterior oropharyngeal erythema.   Eyes:      General: No scleral icterus.     Conjunctiva/sclera: Conjunctivae normal.      Pupils: Pupils are equal, round, and reactive to light.   Cardiovascular:      Rate and Rhythm: Regular rhythm. Tachycardia present.   Pulmonary:      Effort: Pulmonary effort is normal.      Breath sounds: Normal breath sounds.   Abdominal:      General: Bowel sounds are normal.      Palpations: Abdomen is soft.      Tenderness: There is no abdominal tenderness. There is no guarding.   Musculoskeletal:         General: Normal range of motion.      Cervical back: Normal range of motion and neck supple.      Right  lower leg: No edema.      Left lower leg: No edema.   Skin:     General: Skin is warm and dry.   Neurological:      Mental Status: He is alert.   Psychiatric:         Attention and Perception: Attention normal.         Mood and Affect: Mood and affect normal.         Speech: Speech normal.         Behavior: Behavior normal. Behavior is cooperative.         Procedures           ED Course                                                 MDM  Number of Diagnoses or Management Options     Amount and/or Complexity of Data Reviewed  Clinical lab tests: reviewed and ordered  Tests in the radiology section of CPT®: reviewed and ordered  Tests in the medicine section of CPT®: ordered and reviewed  Decide to obtain previous medical records or to obtain history from someone other than the patient: yes  Review and summarize past medical records: yes  Discuss the patient with other providers: yes (Dr. Ham Duong (attending))    Patient Progress  Patient progress: improved    Patient is a 61-year-old male presenting to ED from PCP with abnormal imaging and flank pain.  CBC is elevated relative neutrophils 70%, decreased 12 lymphocytes 5%, percent metamyelocytes, and no further acute findings.  CMP with hyponatremia 132, ALT 52, AST 70.  Lactic acid WNL, procalcitonin WNL.  CRP elevated at 12.21, LDH elevated at 44.  D-dimer elevated at 0.84.  Covid testing positive.  ABG with alkalotic pH 7.489, decreased PCO2 of 31.8, decreased PO2 of 59 on room air. Chest x-ray showed: Pneumonia.  Chest CTA showed: Bilateral patchy peripheral groundglass opacities consistent with COVID-19 pneumonia, no evidence of embolic disease.  Patient was given Tylenol as well as a fluid bolus and Zofran and reported feeling much better.  Vital signs remained stable with no evidence of tachycardia, afebrile.  At rest patient oxygenated 94 to 95% and with exertion he went to 92% room air.  Discussed with patient risk, benefits, and alternatives to  inpatient versus outpatient treatment due to his borderline hypoxic states.  Discussed ABG findings as well as SPO2.  Patient very adamant that he would prefer to continue treatment at home and declines hospitalization at this time.  Patient is able to verbalize risk, benefits, alternatives and continues to wish to proceed with this decision.  Case was discussed with Dr. Ham Duong who reccomends initiation of abx and steoird treatment.  Discussed with patient as well as reviewed appropriate use of pulse oximeter and should he go under 90% need for very close return precautions and need for reevaluation.  Discussed return precautions.  Discussed need for immediate return to ED should he develop any new or worsening symptoms.  Patient offered admission one further time and continues to decline.  Provided patient list of available primary care providers in the area and discussed need for reevaluation within the next 24 to 48 hours.  Patient with no further questions concerns, needs at this time and is stable for discharge.      Final diagnoses:   COVID-19   Pneumonia due to COVID-19 virus       ED Disposition  ED Disposition     ED Disposition Condition Comment    Discharge Stable           Provider, No Known  [place] [address]    Schedule an appointment as soon as possible for a visit in 1 day      [facility] Emergency Department  [address] [place] [id number] [phone]    As needed         Medication List      New Prescriptions    azithromycin 250 MG tablet  Commonly known as: ZITHROMAX  Take 2 tablets the first day, then 1 tablet daily for 4 days.     brompheniramine-pseudoephedrine-DM 30-2-10 MG/5ML syrup  Take 5 mL by mouth 4 (Four) Times a Day As Needed for Congestion or Cough.     methylPREDNISolone 4 MG dose pack  Commonly known as: MEDROL  Take as directed on package instructions.           Where to Get Your Medications      These medications were  sent to [facility] - [place], [place] - [address] - [phone]  - [phone] FX  [address] [facility] [place] [address]    Phone: [phone]   · azithromycin 250 MG tablet  · brompheniramine-pseudoephedrine-DM 30-2-10 MG/5ML syrup  · methylPREDNISolone 4 MG dose pack          Jason Cook PA-C  02/07/22 2055

## 2022-02-08 ENCOUNTER — READMISSION MANAGEMENT (OUTPATIENT)
Dept: CALL CENTER | Facility: HOSPITAL | Age: 62
End: 2022-02-08

## 2022-02-08 NOTE — OUTREACH NOTE
Prep Survey      Responses   [facility] patient discharged from? [place]   Is LACE score < 7 ? No   Emergency Room discharge w/ pulse ox? Yes   Eligibility Readm Mgmt   Discharge diagnosis COVID,  COVID pneumonia    Does the patient have one of the following disease processes/diagnoses(primary or secondary)? COVID-19   Does the patient have Home health ordered? No   Is there a DME ordered? Yes   What DME was ordered? pulse ox   General alerts for this patient ED discharge call X 3    Prep survey completed? Yes          Arlen Kumar RN

## 2022-02-08 NOTE — DISCHARGE INSTRUCTIONS
It is very important for primary care provider, please see list below for available providers in the area.  As we discussed please use the pulse oximeter to measure your oxygen levels and should you go under 90% return for repeat evaluation.  Please complete the antibiotic and steroids in their entirety.  Please increase rest, increase hydration, use Tylenol.  It is important that you are also doing small but frequent movements throughout the day.  Should you develop any new or worsening symptoms please do not hesitate to return to the ER for further evaluation.    Follow up with one of the [facility] physician groups below to setup primary care. If you have trouble making an appointment, please call the [facility] Nurse Line at [phone]    Dr. Guera Ramirez DO, Dr. Sp Turner DO, and DAIANA De La Cruz  [facility]  [address] [place] [address], [address] [phone]    Dr. Jacob Sands MD  [facility] - [place] [address], [address], [place] [address] [phone]    Dr. Raymon Gilman DO, Dr. Jerome Mcbride DO,  DAIANA Ross, and DAIANA Brown  [facility] - [place] [address], [address], [place] [address] [phone]     Dr. Laisha Jarrell MD, and DAIANA Luna  [facility] - [address] [address] [phone]    Dr. Juvenal Cohen MD and Dr. Benoit Rajput MD  [facility] [place] [facility]  [address] [place] [address], [address] [phone]    Dr. Klaus Stevenson MD  [facility] [place]  [address] [place] [address], [facility], [place], [place], [address] [phone]    Dr. Marco Valadez MD  [facility]  [address] [place], [place],  [address]  [phone]                10 Things You Can Do to Manage Your COVID-19 Symptoms at Home  If you have possible or confirmed COVID-19:  1. Stay home except to get medical care.  2. Monitor your symptoms carefully. If your symptoms get worse, call your healthcare provider immediately.  3. Get rest and stay hydrated.  4. If you have a medical appointment, call the healthcare provider ahead of time and tell them that you have or may have COVID-19.  5. For medical emergencies, call 911 and notify the dispatch personnel that you have or may have COVID-19.  6. Cover your cough and sneezes with a tissue or use the inside of your elbow.  7. Wash your hands often with soap and water for at least 20 seconds or clean your hands with an alcohol-based hand  that contains at least 60% alcohol.  8. As much as possible, stay in a specific room and away from other people in your home. Also, you should use a separate bathroom, if available. If you need to be around other people in or outside of the home, wear a mask.  9. Avoid sharing personal items with other people in your household, like dishes, towels, and bedding.  10. Clean all surfaces that are touched often, like counters, tabletops, and doorknobs. Use household cleaning sprays or wipes according to the label instructions.  cdc.gov/coronavirus  07/16/2021  This information is not intended to replace advice given to you by your health care provider. Make sure you discuss any questions you have with your health care provider.  Document Revised: 08/04/2021 Document Reviewed: 08/04/2021  Elsevier Patient Education © 2021 [employer] Inc[employer]

## 2022-02-08 NOTE — OUTREACH NOTE
COVID-19 Week 1 Survey      Responses   [facility] patient discharged from? [place]   Does the patient have one of the following disease processes/diagnoses(primary or secondary)? COVID-19   COVID-19 underlying condition? None   Call Number Call 1   Week 1 Call successful? No  [ATTEMPTED PATIENT'S AND WIFE'S NUMBERS]   Discharge diagnosis COVID,  COVID pneumonia           Carmen Leiva, KIARAN

## 2022-02-09 ENCOUNTER — READMISSION MANAGEMENT (OUTPATIENT)
Dept: CALL CENTER | Facility: HOSPITAL | Age: 62
End: 2022-02-09

## 2022-02-09 NOTE — OUTREACH NOTE
COVID-19 Week 1 Survey      Responses   [facility] patient discharged from? [place]   Does the patient have one of the following disease processes/diagnoses(primary or secondary)? COVID-19   COVID-19 underlying condition? None   Call Number Call 2   Week 1 Call successful? No   Discharge diagnosis COVID,  COVID pneumonia           Sonya Lagunas RN

## 2022-02-10 ENCOUNTER — READMISSION MANAGEMENT (OUTPATIENT)
Dept: CALL CENTER | Facility: HOSPITAL | Age: 62
End: 2022-02-10

## 2022-02-10 NOTE — OUTREACH NOTE
COVID-19 Week 1 Survey      Responses   [facility] patient discharged from? [place]   Does the patient have one of the following disease processes/diagnoses(primary or secondary)? COVID-19   COVID-19 underlying condition? None   Call Number Call 3   Week 1 Call successful? No   Revoke Decline to participate  [ed visit-only 3 calls]   Discharge diagnosis COVID,  COVID pneumonia           DESI MAGDALENO RN

## 2022-02-14 NOTE — ADDENDUM NOTE
Addended by: ARSH LEWIS on: 2/14/2022 07:12 AM     Modules accepted: Level of Service    
Addended by: ARSH LEWIS on: 2/7/2022 03:37 PM     Modules accepted: Orders    
sudden onset

## 2023-05-12 ENCOUNTER — OFFICE VISIT (OUTPATIENT)
Dept: FAMILY MEDICINE CLINIC | Facility: CLINIC | Age: 63
End: 2023-05-12
Payer: COMMERCIAL

## 2023-05-12 VITALS
WEIGHT: 213 LBS | RESPIRATION RATE: 20 BRPM | TEMPERATURE: 98.6 F | HEIGHT: 71 IN | SYSTOLIC BLOOD PRESSURE: 155 MMHG | BODY MASS INDEX: 29.82 KG/M2 | DIASTOLIC BLOOD PRESSURE: 88 MMHG | HEART RATE: 73 BPM

## 2023-05-12 DIAGNOSIS — H65.92 FLUID LEVEL BEHIND TYMPANIC MEMBRANE OF LEFT EAR: Primary | ICD-10-CM

## 2023-05-12 DIAGNOSIS — J30.89 ENVIRONMENTAL AND SEASONAL ALLERGIES: ICD-10-CM

## 2023-05-12 PROCEDURE — 99213 OFFICE O/P EST LOW 20 MIN: CPT | Performed by: NURSE PRACTITIONER

## 2023-05-12 RX ORDER — METHYLPREDNISOLONE 4 MG/1
TABLET ORAL
Qty: 1 EACH | Refills: 0 | Status: SHIPPED | OUTPATIENT
Start: 2023-05-12

## 2023-05-12 RX ORDER — CETIRIZINE HYDROCHLORIDE 10 MG/1
10 TABLET ORAL DAILY
Qty: 30 TABLET | Refills: 2 | Status: SHIPPED | OUTPATIENT
Start: 2023-05-12

## 2023-05-12 RX ORDER — FLUTICASONE PROPIONATE 50 MCG
2 SPRAY, SUSPENSION (ML) NASAL DAILY
Qty: 18.2 ML | Refills: 2 | Status: SHIPPED | OUTPATIENT
Start: 2023-05-12

## 2023-05-12 NOTE — PROGRESS NOTES
"Chief Complaint  Tinnitus    Subjective    History of Present Illness      Patient presents to [facility] for   History of Present Illness  Pt is here today c/o of ringer in his left ear x 1 month, on and off.         Review of Systems   Constitutional: Negative.    HENT: Positive for tinnitus.    Eyes: Negative.    Respiratory: Negative.    Cardiovascular: Negative.    Gastrointestinal: Negative.    Endocrine: Negative.    Genitourinary: Negative.    Musculoskeletal: Negative.    Skin: Negative.    Allergic/Immunologic: Negative.    Neurological: Negative.    Hematological: Negative.    Psychiatric/Behavioral: Negative.        I have reviewed and agree with the HPI and ROS information as above.  Frances Kwan, APRN     Objective   Vital Signs:   /88   Pulse 73   Temp 98.6 °F (37 °C)   Resp 20   Ht 180.3 cm (71\")   Wt 96.6 kg (213 lb)   BMI 29.71 kg/m²     BMI is >= 25 and <30. (Overweight) The following options were offered after discussion;: weight loss educational material (shared in after visit summary)      Physical Exam  Constitutional:       Appearance: Normal appearance. He is well-developed.   HENT:      Head: Normocephalic and atraumatic.      Right Ear: External ear normal.      Left Ear: External ear normal. A middle ear effusion is present.      Nose: Nose normal. No nasal tenderness or congestion.      Mouth/Throat:      Lips: Pink. No lesions.      Mouth: Mucous membranes are moist. No oral lesions.      Dentition: Normal dentition.      Pharynx: Oropharynx is clear. No pharyngeal swelling, oropharyngeal exudate or posterior oropharyngeal erythema.   Eyes:      General: Lids are normal. Vision grossly intact. No scleral icterus.        Right eye: No discharge.         Left eye: No discharge.      Extraocular Movements: Extraocular movements intact.      Conjunctiva/sclera: Conjunctivae normal.      Right eye: Right conjunctiva is not injected.      Left " eye: Left conjunctiva is not injected.      Pupils: Pupils are equal, round, and reactive to light.   Cardiovascular:      Rate and Rhythm: Normal rate and regular rhythm.      Heart sounds: Normal heart sounds. No murmur heard.    No gallop.   Pulmonary:      Effort: Pulmonary effort is normal.      Breath sounds: Normal breath sounds and air entry. No wheezing, rhonchi or rales.   Musculoskeletal:         General: No tenderness or deformity. Normal range of motion.      Cervical back: Full passive range of motion without pain, normal range of motion and neck supple.      Right lower leg: No edema.      Left lower leg: No edema.   Skin:     General: Skin is warm and dry.      Coloration: Skin is not jaundiced.      Findings: No rash.   Neurological:      Mental Status: He is alert and oriented to person, place, and time.      Sensory: Sensation is intact.      Motor: Motor function is intact.      Coordination: Coordination is intact.      Gait: Gait is intact.   Psychiatric:         Attention and Perception: Attention normal.         Mood and Affect: Mood and affect normal.         Behavior: Behavior is not hyperactive. Behavior is cooperative.         Thought Content: Thought content normal.         Judgment: Judgment normal.          JB-7: Over the last two weeks, how often have you been bothered by the following problems?  Feeling nervous, anxious or on edge: Not at all  Not being able to stop or control worrying: Not at all  Worrying too much about different things: Not at all  Trouble Relaxing: Not at all  Being so restless that it is hard to sit still: Not at all  Becoming easily annoyed or irritable: More than half the days  Feeling afraid as if something awful might happen: Not at all  JB 7 Total Score: 2  If you checked any problems, how difficult have these problems made it for you to do your work, take care of things at home, or get along with other people: Not difficult at all    PHQ-2 Depression  Screening  Little interest or pleasure in doing things? 0-->not at all   Feeling down, depressed, or hopeless? 0-->not at all   PHQ-2 Total Score 0     PHQ-9 Depression Screening  Little interest or pleasure in doing things? 0-->not at all   Feeling down, depressed, or hopeless? 0-->not at all   Trouble falling or staying asleep, or sleeping too much?     Feeling tired or having little energy?     Poor appetite or overeating?     Feeling bad about yourself - or that you are a failure or have let yourself or your family down?     Trouble concentrating on things, such as reading the newspaper or watching television?     Moving or speaking so slowly that other people could have noticed? Or the opposite - being so fidgety or restless that you have been moving around a lot more than usual?     Thoughts that you would be better off dead, or of hurting yourself in some way?     PHQ-9 Total Score 0   If you checked off any problems, how difficult have these problems made it for you to do your work, take care of things at home, or get along with other people?        Result Review  Data Reviewed:              Assessment and Plan      Diagnoses and all orders for this visit:    1. Fluid level behind tympanic membrane of left ear (Primary)  -     methylPREDNISolone (MEDROL) 4 MG dose pack; Take as directed on package instructions.  Dispense: 1 each; Refill: 0  -     fluticasone (FLONASE) 50 MCG/ACT nasal spray; 2 sprays into the nostril(s) as directed by provider Daily.  Dispense: 18.2 mL; Refill: 2  -     cetirizine (zyrTEC) 10 MG tablet; Take 1 tablet by mouth Daily.  Dispense: 30 tablet; Refill: 2    2. Environmental and seasonal allergies  -     fluticasone (FLONASE) 50 MCG/ACT nasal spray; 2 sprays into the nostril(s) as directed by provider Daily.  Dispense: 18.2 mL; Refill: 2  -     cetirizine (zyrTEC) 10 MG tablet; Take 1 tablet by mouth Daily.  Dispense: 30 tablet; Refill: 2      Patient here today with complaints of  ringing in his ears x1 month.  He states the left ear is worse than the right.  He does admit to having sinus and allergy symptoms.  Denies any ear pain.  Fluid noted behind left TM, not infected.    Plan:    1.  Start Medrol Dosepak.  2.  Start Flonase nasal spray and Zyrtec daily.  3.  Follow-up if symptoms do not improve or become worse.      Follow Up   Return if symptoms worsen or fail to improve.  Patient was given instructions and counseling regarding his condition or for health maintenance advice. Please see specific information pulled into the AVS if appropriate.

## 2023-09-17 ENCOUNTER — APPOINTMENT (OUTPATIENT)
Dept: CT IMAGING | Facility: HOSPITAL | Age: 63
DRG: 066 | End: 2023-09-17
Payer: COMMERCIAL

## 2023-09-17 ENCOUNTER — APPOINTMENT (OUTPATIENT)
Dept: MRI IMAGING | Facility: HOSPITAL | Age: 63
DRG: 066 | End: 2023-09-17
Payer: COMMERCIAL

## 2023-09-17 ENCOUNTER — APPOINTMENT (OUTPATIENT)
Dept: GENERAL RADIOLOGY | Facility: HOSPITAL | Age: 63
DRG: 066 | End: 2023-09-17
Payer: COMMERCIAL

## 2023-09-17 ENCOUNTER — HOSPITAL ENCOUNTER (INPATIENT)
Facility: HOSPITAL | Age: 63
LOS: 2 days | Discharge: HOME | DRG: 066 | End: 2023-09-19
Attending: EMERGENCY MEDICINE | Admitting: STUDENT IN AN ORGANIZED HEALTH CARE EDUCATION/TRAINING PROGRAM
Payer: COMMERCIAL

## 2023-09-17 DIAGNOSIS — Z72.0 TOBACCO ABUSE: ICD-10-CM

## 2023-09-17 DIAGNOSIS — R41.89 COGNITIVE CHANGES: ICD-10-CM

## 2023-09-17 DIAGNOSIS — E78.5 HYPERLIPIDEMIA, UNSPECIFIED HYPERLIPIDEMIA TYPE: ICD-10-CM

## 2023-09-17 DIAGNOSIS — R03.0 ELEVATED BLOOD PRESSURE READING: ICD-10-CM

## 2023-09-17 DIAGNOSIS — I63.9 CEREBROVASCULAR ACCIDENT (CVA), UNSPECIFIED MECHANISM: Primary | ICD-10-CM

## 2023-09-17 DIAGNOSIS — R26.9 GAIT ABNORMALITY: ICD-10-CM

## 2023-09-17 PROBLEM — I69.30 SEQUELA OF LACUNAR INFARCTION: Status: ACTIVE | Noted: 2023-09-17

## 2023-09-17 PROBLEM — I63.81 CEREBROVASCULAR ACCIDENT (CVA) OF LEFT THALAMUS: Status: ACTIVE | Noted: 2023-09-17

## 2023-09-17 LAB
ALBUMIN SERPL-MCNC: 4.1 G/DL (ref 3.5–5.2)
ALBUMIN/GLOB SERPL: 1.3 G/DL
ALP SERPL-CCNC: 83 U/L (ref 39–117)
ALT SERPL W P-5'-P-CCNC: 10 U/L (ref 1–41)
ANION GAP SERPL CALCULATED.3IONS-SCNC: 10 MMOL/L (ref 5–15)
AST SERPL-CCNC: 13 U/L (ref 1–40)
BASOPHILS # BLD AUTO: 0.03 10*3/MM3 (ref 0–0.2)
BASOPHILS NFR BLD AUTO: 0.4 % (ref 0–1.5)
BILIRUB SERPL-MCNC: 0.5 MG/DL (ref 0–1.2)
BUN SERPL-MCNC: 15 MG/DL (ref 8–23)
BUN/CREAT SERPL: 14.9 (ref 7–25)
CALCIUM SPEC-SCNC: 9.2 MG/DL (ref 8.6–10.5)
CHLORIDE SERPL-SCNC: 105 MMOL/L (ref 98–107)
CO2 SERPL-SCNC: 23 MMOL/L (ref 22–29)
CREAT SERPL-MCNC: 1.01 MG/DL (ref 0.76–1.27)
DEPRECATED RDW RBC AUTO: 40.5 FL (ref 37–54)
EGFRCR SERPLBLD CKD-EPI 2021: 84.6 ML/MIN/1.73
EOSINOPHIL # BLD AUTO: 0.06 10*3/MM3 (ref 0–0.4)
EOSINOPHIL NFR BLD AUTO: 0.9 % (ref 0.3–6.2)
ERYTHROCYTE [DISTWIDTH] IN BLOOD BY AUTOMATED COUNT: 11.9 % (ref 12.3–15.4)
GLOBULIN UR ELPH-MCNC: 3.2 GM/DL
GLUCOSE BLDC GLUCOMTR-MCNC: 117 MG/DL (ref 70–130)
GLUCOSE SERPL-MCNC: 161 MG/DL (ref 65–99)
HCT VFR BLD AUTO: 47.2 % (ref 37.5–51)
HGB BLD-MCNC: 15.7 G/DL (ref 13–17.7)
IMM GRANULOCYTES # BLD AUTO: 0.02 10*3/MM3 (ref 0–0.05)
IMM GRANULOCYTES NFR BLD AUTO: 0.3 % (ref 0–0.5)
INR PPP: 1.11 (ref 0.91–1.09)
LYMPHOCYTES # BLD AUTO: 1.54 10*3/MM3 (ref 0.7–3.1)
LYMPHOCYTES NFR BLD AUTO: 22.1 % (ref 19.6–45.3)
MAGNESIUM SERPL-MCNC: 2.2 MG/DL (ref 1.6–2.4)
MCH RBC QN AUTO: 30.5 PG (ref 26.6–33)
MCHC RBC AUTO-ENTMCNC: 33.3 G/DL (ref 31.5–35.7)
MCV RBC AUTO: 91.8 FL (ref 79–97)
MONOCYTES # BLD AUTO: 0.61 10*3/MM3 (ref 0.1–0.9)
MONOCYTES NFR BLD AUTO: 8.8 % (ref 5–12)
NEUTROPHILS NFR BLD AUTO: 4.7 10*3/MM3 (ref 1.7–7)
NEUTROPHILS NFR BLD AUTO: 67.5 % (ref 42.7–76)
NRBC BLD AUTO-RTO: 0 /100 WBC (ref 0–0.2)
PLATELET # BLD AUTO: 220 10*3/MM3 (ref 140–450)
PMV BLD AUTO: 11.1 FL (ref 6–12)
POTASSIUM SERPL-SCNC: 4.2 MMOL/L (ref 3.5–5.2)
PROT SERPL-MCNC: 7.3 G/DL (ref 6–8.5)
PROTHROMBIN TIME: 14.4 SECONDS (ref 11.8–14.8)
RBC # BLD AUTO: 5.14 10*6/MM3 (ref 4.14–5.8)
SODIUM SERPL-SCNC: 138 MMOL/L (ref 136–145)
WBC NRBC COR # BLD: 6.96 10*3/MM3 (ref 3.4–10.8)

## 2023-09-17 PROCEDURE — 80053 COMPREHEN METABOLIC PANEL: CPT | Performed by: EMERGENCY MEDICINE

## 2023-09-17 PROCEDURE — A9577 INJ MULTIHANCE: HCPCS | Performed by: FAMILY MEDICINE

## 2023-09-17 PROCEDURE — 82948 REAGENT STRIP/BLOOD GLUCOSE: CPT

## 2023-09-17 PROCEDURE — 83735 ASSAY OF MAGNESIUM: CPT | Performed by: EMERGENCY MEDICINE

## 2023-09-17 PROCEDURE — 70553 MRI BRAIN STEM W/O & W/DYE: CPT

## 2023-09-17 PROCEDURE — 71045 X-RAY EXAM CHEST 1 VIEW: CPT

## 2023-09-17 PROCEDURE — 70450 CT HEAD/BRAIN W/O DYE: CPT

## 2023-09-17 PROCEDURE — 93010 ELECTROCARDIOGRAM REPORT: CPT | Performed by: INTERNAL MEDICINE

## 2023-09-17 PROCEDURE — 85025 COMPLETE CBC W/AUTO DIFF WBC: CPT | Performed by: EMERGENCY MEDICINE

## 2023-09-17 PROCEDURE — 93005 ELECTROCARDIOGRAM TRACING: CPT | Performed by: EMERGENCY MEDICINE

## 2023-09-17 PROCEDURE — 70496 CT ANGIOGRAPHY HEAD: CPT

## 2023-09-17 PROCEDURE — 0 GADOBENATE DIMEGLUMINE 529 MG/ML SOLUTION: Performed by: FAMILY MEDICINE

## 2023-09-17 PROCEDURE — 25510000001 IOPAMIDOL PER 1 ML: Performed by: FAMILY MEDICINE

## 2023-09-17 PROCEDURE — 85610 PROTHROMBIN TIME: CPT | Performed by: EMERGENCY MEDICINE

## 2023-09-17 PROCEDURE — 99285 EMERGENCY DEPT VISIT HI MDM: CPT

## 2023-09-17 PROCEDURE — 70498 CT ANGIOGRAPHY NECK: CPT

## 2023-09-17 RX ORDER — ACETAMINOPHEN 650 MG/1
650 SUPPOSITORY RECTAL EVERY 4 HOURS PRN
Status: DISCONTINUED | OUTPATIENT
Start: 2023-09-17 | End: 2023-09-19 | Stop reason: HOSPADM

## 2023-09-17 RX ORDER — SODIUM CHLORIDE 0.9 % (FLUSH) 0.9 %
10 SYRINGE (ML) INJECTION EVERY 12 HOURS SCHEDULED
Status: DISCONTINUED | OUTPATIENT
Start: 2023-09-17 | End: 2023-09-19 | Stop reason: HOSPADM

## 2023-09-17 RX ORDER — SODIUM CHLORIDE 0.9 % (FLUSH) 0.9 %
10 SYRINGE (ML) INJECTION AS NEEDED
Status: DISCONTINUED | OUTPATIENT
Start: 2023-09-17 | End: 2023-09-19 | Stop reason: HOSPADM

## 2023-09-17 RX ORDER — ASPIRIN 300 MG/1
300 SUPPOSITORY RECTAL DAILY
Status: DISCONTINUED | OUTPATIENT
Start: 2023-09-18 | End: 2023-09-19 | Stop reason: HOSPADM

## 2023-09-17 RX ORDER — LABETALOL HYDROCHLORIDE 5 MG/ML
10 INJECTION, SOLUTION INTRAVENOUS
Status: DISCONTINUED | OUTPATIENT
Start: 2023-09-17 | End: 2023-09-19 | Stop reason: HOSPADM

## 2023-09-17 RX ORDER — ATORVASTATIN CALCIUM 40 MG/1
80 TABLET, FILM COATED ORAL NIGHTLY
Status: DISCONTINUED | OUTPATIENT
Start: 2023-09-17 | End: 2023-09-19 | Stop reason: HOSPADM

## 2023-09-17 RX ORDER — ACETAMINOPHEN 325 MG/1
650 TABLET ORAL EVERY 4 HOURS PRN
Status: DISCONTINUED | OUTPATIENT
Start: 2023-09-17 | End: 2023-09-19 | Stop reason: HOSPADM

## 2023-09-17 RX ORDER — SODIUM CHLORIDE 9 MG/ML
40 INJECTION, SOLUTION INTRAVENOUS AS NEEDED
Status: DISCONTINUED | OUTPATIENT
Start: 2023-09-17 | End: 2023-09-19 | Stop reason: HOSPADM

## 2023-09-17 RX ORDER — ASPIRIN 81 MG/1
81 TABLET, CHEWABLE ORAL DAILY
Status: DISCONTINUED | OUTPATIENT
Start: 2023-09-18 | End: 2023-09-19 | Stop reason: HOSPADM

## 2023-09-17 RX ADMIN — Medication 10 ML: at 20:37

## 2023-09-17 RX ADMIN — IOPAMIDOL 100 ML: 755 INJECTION, SOLUTION INTRAVENOUS at 16:41

## 2023-09-17 RX ADMIN — GADOBENATE DIMEGLUMINE 20 ML: 529 INJECTION, SOLUTION INTRAVENOUS at 16:05

## 2023-09-17 RX ADMIN — ATORVASTATIN CALCIUM 80 MG: 40 TABLET, FILM COATED ORAL at 20:37

## 2023-09-17 NOTE — CONSULTS
Neurology Consult Note    Consult Date: 2023  Referring MD: No ref. provider found  Reason for Consult: Stroke     Patient: Des Silveira (63 y.o. male)  MRN: [id number]  : 1960    History of Present Illness:   Des Silveira is a 63 y.o. male    63-year-old male presenting to our emergency room for complaints of right arm and leg numbness with weakness.  Last known well 2100 2023 and onset 0100 2023.  Patient reports having to eat last night.  He went to bed and when he got up he could not control his right side and fell.  Patient presented to our ER at about 1030 this morning.  Patient denies any past medical history.  He does not take prescription medications.  He does not follow with a primary care provider routinely.  He does report most recently he had a episode of middle ear effusion.  This was treated with Flonase, Zyrtec, and a Medrol Dosepak.  He reports continued tinnitus.  Denies headaches.  Work-up in the emergency room revealed CT head with a small area of hypodensity in the left frontal parietal junction.  Patient is seen with his wife in the room.  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  He denies any loud noises or occupational exposures.  Patient does admit to smoking 1 cigar a day.    He does complain to me about continued right-sided numbness.  He has no further complaints.  He denies any dysarthria or dysphagia.  No visual abnormalities.  Thrombolytic therapy was not administered due to last known well greater than 4 and half hours.  Code stroke not activated due to NIH less than 6.  Interventional radiology was not considered due to signs not consistent with a large vessel occlusion.    Medical History:   Past Medical/Surgical Hx:  History reviewed. No pertinent past medical history.  History reviewed. No pertinent surgical history.    Medications On Admission:  (Not in a hospital admission)      Current Medications:    Current Facility-Administered  Medications:     [COMPLETED] Insert Peripheral IV, , , Once **AND** sodium chloride 0.9 % flush 10 mL, 10 mL, Intravenous, PRN, Michael Norris MD    Current Outpatient Medications:     fluticasone (FLONASE) 50 MCG/ACT nasal spray, 2 sprays into the nostril(s) as directed by provider Daily., Disp: 18.2 mL, Rfl: 2    cetirizine (zyrTEC) 10 MG tablet, Take 1 tablet by mouth Daily., Disp: 30 tablet, Rfl: 2    methylPREDNISolone (MEDROL) 4 MG dose pack, Take as directed on package instructions., Disp: 1 each, Rfl: 0     Allergies:  No Known Allergies    Social Hx:  Social History     Socioeconomic History    Marital status:    Tobacco Use    Smoking status: Every Day    Smokeless tobacco: Never   Vaping Use    Vaping Use: Never used       Family Hx:  Family History   Problem Relation Age of Onset    No Known Problems Father     No Known Problems Mother      Physical Examination:   Vital Signs:  Vitals:    09/17/23 1231 09/17/23 1246 09/17/23 1301 09/17/23 1316   BP: 146/89 152/84 173/97 172/100   Pulse: 85 81 85 80   Resp:       Temp:       SpO2: 96% 96% 97% 95%   Weight:       Height:           General Exam:  Head:  Normocephalic, atraumatic  HEENT:  Neck supple  CVS:  Regular rate and rhythm.  No murmurs  Carotid Examination:  No bruits  Lungs:  Clear to auscultation  Abdomen:  Nontender, Nondistended  Extremities:  No signs of peripheral edema  Skin:  No rashes    Neurologic Exam:    Mental Status:    -Awake, Alert, Oriented X 3  -No word finding difficulties  -No aphasia  -No dysarthria  -Follows simple and complex commands    CN II:  Visual fields full.  Pupils equally reactive to light  CN III, IV, VI:  Extraocular Muscles full with no signs of nystagmus  CN V:  Facial sensory is symmetric with no asymmetries.  CN VII:  Facial motor symmetric  CN VIII:  Gross hearing intact bilaterally  CN IX:  Palate elevates symmetrically  CN X:  Palate elevates symmetrically  CN XI:  Shoulder shrug symmetric  CN  XII:  Tongue is midline on protrusion    Motor: (strength out of 5:  1= minimal movement, 2 = movement in plane of gravity, 3 = movement against gravity, 4 = movement against some resistance, 5 = full strength)    -Right Upper Ext: Proximal: 5 Distal: 4+  -Left Upper Ext: Proximal: 5 Distal: 5    -Right Lower Ext: Proximal: 5 Distal: 5  -Left Lower Ext: Proximal: 5 Distal: 5    DTR:  -Right   Biceps: 2+ Triceps: 2+ Brachioradialis: 2+   Patella: 2+ Ankle: 2+ Neg Babinski  -Left   Biceps: 2+ Triceps: 2+ Brachioradialis: 2+   Patella: 2+ Ankle: 2+ Neg Babinski    Sensory:  -Light touch abnormality to right face, arm and leg.  Sensation is less than left.    Coordination:  -Finger to nose abnormal.  Ataxia noted to right side  -Heel to shin intact      Gait  Gait not tested during my exam.    Recent Diagnostics:   Laboratory Results:   - Reviewed in EMR  Lab Results   Component Value Date    GLUCOSE 161 (H) 09/17/2023    CALCIUM 9.2 09/17/2023     09/17/2023    K 4.2 09/17/2023    CO2 23.0 09/17/2023     09/17/2023    BUN 15 09/17/2023    CREATININE 1.01 09/17/2023    EGFRIFNONA 78 02/07/2022    BCR 14.9 09/17/2023    ANIONGAP 10.0 09/17/2023     Lab Results   Component Value Date    WBC 6.96 09/17/2023    HGB 15.7 09/17/2023    HCT 47.2 09/17/2023    MCV 91.8 09/17/2023     09/17/2023     Lab Results   Component Value Date    INR 1.11 (H) 09/17/2023     No results found for: CHOLTOT, TRIG, HDL, LDL  No results found for: HGBA1C    Imaging Results:  Imaging Results (Last 24 Hours)       Procedure Component Value Units Date/Time    CT Head Without Contrast [[id number]] Collected: 09/17/23 1132     Updated: 09/17/23 1139    Narrative:      EXAMINATION:  CT HEAD WO CONTRAST-  9/17/2023 11:05 AM CDT     HISTORY: Right arm and leg numbness and weakness since last night.     TECHNIQUE: Multiple axial images were obtained through the brain without  contrast infusion. Multiplanar images were reconstructed.      DLP: 712 mGy-cm. Automated dosage reduction technique was utilized to  reduce patient dosage.     COMPARISON: No comparison study.     FINDINGS: There is a small area of cortical low density in the left  frontal-parietal convexity region (axial image 23 series 3 and coronal  image 9 series 7). There are no hemorrhage, edema or mass effect. There  is a 7 mm CSF density lesion in the left basal ganglia region  anteriorly. The ventricular system is nondilated. There is mild vascular  calcification. The paranasal sinuses and mastoid air cells are clear. No  calvarial fracture is seen.          Impression:      1. A small area of cortical low density in the left frontal-parietal  convexity may represent a small infarct.  2. No hemorrhage, edema or mass effect.  3. A 7 mm area of CSF density in the left basal ganglia region  anteriorly may represent a small chronic lacunar infarct or a dilated  perivascular space.        The full report of this exam was immediately signed and available to the  emergency room. The patient is currently in the emergency room.     This report was finalized on 09/17/2023 11:36 by Dr. Oral Silveira MD.    XR Chest 1 View [[id number]] Collected: 09/17/23 1118     Updated: 09/17/23 1122    Narrative:      EXAMINATION:  XR CHEST 1 VW-  9/17/2023 10:48 AM CDT     HISTORY: Stroke evaluation.     COMPARISON: No comparison study.     TECHNIQUE: Single view AP image.     FINDINGS:  The lungs are expanded bilaterally and clear. There is stable  mild elevation/eventration of the right hemidiaphragm. The pleural  spaces are clear. Heart size is normal. No acute bony abnormality is  seen.          Impression:      No active disease is seen.        This report was finalized on 09/17/2023 11:19 by Dr. Oral Silveira MD.           CT head performed 9/17/2023 reviewed and interpreted by me as showing a hypodensity in the left frontal parietal lobe junction.    Other labs:    Other RAD:        Assessment &  Plan:   Patient presenting for symptoms concerning for stroke.  He does have some subjective right-sided sensory loss.  Exam revealed distal right arm weakness.   were equal however.  Etiology behind stroke potentially small vessel disease related to uncontrolled hypertension and tobacco use.  Recommend patient being admitted to hospitalist service with neurology consultation.  We will begin stroke work-up and implement secondary stroke preventative measures.    Impression:  Acute ischemic stroke  Right-sided numbness  Distal right arm weakness  Right arm ataxia   Tobacco use      Plan:   Aspirin 81 mg daily  Atorvastatin 80 mg nightly  Fasting lipid panel  Hemoglobin A1c  MRI brain without contrast  Carotid ultrasound  Cardiac echocardiogram  Cardiac telemetry  SCDs for VTE prophylaxis  ST, PT, OT evaluations  N.p.o. until dysphagia screening  Every 4 hours neurochecks  Recommend permissive hypertension    Charisse Bella, DAIANA  09/17/23  13:26 CDT    Medical Decision Making    Number/Complexity of Problems  Moderate  1 undiagnosed new problem with uncertain prognosis -   1 acute illness with systemic symptoms -   High  1 acute or chronic illness that pose a threat to life/body function -   High: acute stroke     MDM Data  Moderate - 1/3 categories  Extensive - 2/3 categories    Category 1: 3 of the following  Review of external notes  Review of results  Ordering of each unique test  Independent historian  Category 2:  Independent interpretation of test (ex: imaging)  Category 3:  Discussion of management with another provider    Extensive: Interpretation of CT head and discussion of care with ED provider, review of results     Treatment Plan  Moderate - Prescription Drug management  High  Drug therapy requiring intensive monitoring for toxicity  Decision regarding hospitalization or escalation of care  De-escalate care/DNR decisions  High: Recommending hospital admission and further stroke work-up

## 2023-09-17 NOTE — H&P
[facility] Medicine Services  HISTORY AND PHYSICAL    Date of Admission: 9/17/2023  Primary Care Physician: Provider, No Known    Subjective   Primary Historian: Patient    Chief Complaint: Right arm and leg numbness    History of Present Illness  63 year old male with no significant PMH that presents to the ER with complains of numbness in right upper and lower extremities of sudden onset last night at about 1 AM. He was not sure what it was and decided to come to the ER this morning as symptoms persisted. He presented 9 hours after the onset of symptoms. No prior episodes, no associated chest pain, palpitations or weakness.     Review of Systems   Otherwise complete ROS reviewed and negative except as mentioned in the HPI.    Past Medical History: History reviewed. No pertinent past medical history.  Past Surgical History:History reviewed. No pertinent surgical history.  Social History:  reports that he has been smoking. He has never used smokeless tobacco.    Family History: family history includes No Known Problems in his father and mother.       Allergies:  No Known Allergies    Medications:  Prior to Admission medications    Medication Sig Start Date End Date Taking? Authorizing Provider   fluticasone (FLONASE) 50 MCG/ACT nasal spray 2 sprays into the nostril(s) as directed by provider Daily. 5/12/23  Yes Frances Kwan APRN   cetirizine (zyrTEC) 10 MG tablet Take 1 tablet by mouth Daily. 5/12/23   Frances Kwan APRN   methylPREDNISolone (MEDROL) 4 MG dose pack Take as directed on package instructions. 5/12/23   Frances Kwan APRN     I have utilized all available immediate resources to obtain, update, or review the patient's current medications (including all prescriptions, over-the-counter products, herbals, cannabis/cannabidiol products, and vitamin/mineral/dietary (nutritional) supplements).    Objective     Vital Signs: BP (!)  "191/92   Pulse 107   Temp 98.3 °F (36.8 °C)   Resp 18   Ht 180.3 cm (71\")   Wt 99.3 kg (219 lb)   SpO2 97%   BMI 30.54 kg/m²   Physical Exam  Constitutional:       General: He is not in acute distress.     Appearance: He is well-developed. He is not ill-appearing, toxic-appearing or diaphoretic.   HENT:      Head: Normocephalic and atraumatic.      Right Ear: External ear normal.      Left Ear: External ear normal.      Nose: Nose normal.      Mouth/Throat:      Mouth: Mucous membranes are dry.   Eyes:      General:         Right eye: No discharge.         Left eye: No discharge.      Extraocular Movements: Extraocular movements intact.      Conjunctiva/sclera: Conjunctivae normal.      Pupils: Pupils are equal, round, and reactive to light.   Neck:      Vascular: No JVD.   Cardiovascular:      Rate and Rhythm: Regular rhythm. Tachycardia present.      Heart sounds: Normal heart sounds. No murmur heard.  Pulmonary:      Effort: Pulmonary effort is normal. No respiratory distress.      Breath sounds: Normal breath sounds. No wheezing or rales.   Chest:      Chest wall: No tenderness.   Abdominal:      General: Bowel sounds are normal. There is no distension.      Palpations: Abdomen is soft.      Tenderness: There is no abdominal tenderness. There is no guarding or rebound.   Musculoskeletal:         General: No tenderness or deformity. Normal range of motion.      Cervical back: Normal range of motion and neck supple. No rigidity.      Right lower leg: No edema.      Left lower leg: No edema.   Skin:     General: Skin is warm and dry.      Findings: No rash.   Neurological:      General: No focal deficit present.      Mental Status: He is alert and oriented to person, place, and time. Mental status is at baseline.      Cranial Nerves: No cranial nerve deficit.      Sensory: Sensory deficit present.      Motor: No abnormal muscle tone.      Deep Tendon Reflexes: Reflexes normal.   Psychiatric:         Mood and " Affect: Mood normal.         Behavior: Behavior normal.      Results Reviewed:  Lab Results (last 24 hours)       Procedure Component Value Units Date/Time    Magnesium [[id number]]  (Normal) Collected: 09/17/23 1100    Specimen: Blood from Arm, Left Updated: 09/17/23 1217     Magnesium 2.2 mg/dL     Comprehensive Metabolic Panel [[id number]]  (Abnormal) Collected: 09/17/23 1100    Specimen: Blood from Arm, Left Updated: 09/17/23 1147     Glucose 161 mg/dL      BUN 15 mg/dL      Creatinine 1.01 mg/dL      Sodium 138 mmol/L      Potassium 4.2 mmol/L      Comment: Slight hemolysis detected by analyzer. Results may be affected.        Chloride 105 mmol/L      CO2 23.0 mmol/L      Calcium 9.2 mg/dL      Total Protein 7.3 g/dL      Albumin 4.1 g/dL      ALT (SGPT) 10 U/L      AST (SGOT) 13 U/L      Comment: Slight hemolysis detected by analyzer. Results may be affected.        Alkaline Phosphatase 83 U/L      Total Bilirubin 0.5 mg/dL      Globulin 3.2 gm/dL      A/G Ratio 1.3 g/dL      BUN/Creatinine Ratio 14.9     Anion Gap 10.0 mmol/L      eGFR 84.6 mL/min/1.73     Narrative:      GFR Normal >60  Chronic Kidney Disease <60  Kidney Failure <15      Protime-INR [[id number]]  (Abnormal) Collected: 09/17/23 1100    Specimen: Blood from Arm, Left Updated: 09/17/23 1133     Protime 14.4 Seconds      INR 1.11    CBC & Differential [[id number]]  (Abnormal) Collected: 09/17/23 1100    Specimen: Blood from Arm, Left Updated: 09/17/23 1117    Narrative:      The following orders were created for panel order CBC & Differential.  Procedure                               Abnormality         Status                     ---------                               -----------         ------                     CBC Auto Differential[[id number]]        Abnormal            Final result                 Please view results for these tests on the individual orders.    CBC Auto Differential [[id number]]  (Abnormal) Collected: 09/17/23 1100    Specimen:  Blood from Arm, Left Updated: 09/17/23 1117     WBC 6.96 10*3/mm3      RBC 5.14 10*6/mm3      Hemoglobin 15.7 g/dL      Hematocrit 47.2 %      MCV 91.8 fL      MCH 30.5 pg      MCHC 33.3 g/dL      RDW 11.9 %      RDW-SD 40.5 fl      MPV 11.1 fL      Platelets 220 10*3/mm3      Neutrophil % 67.5 %      Lymphocyte % 22.1 %      Monocyte % 8.8 %      Eosinophil % 0.9 %      Basophil % 0.4 %      Immature Grans % 0.3 %      Neutrophils, Absolute 4.70 10*3/mm3      Lymphocytes, Absolute 1.54 10*3/mm3      Monocytes, Absolute 0.61 10*3/mm3      Eosinophils, Absolute 0.06 10*3/mm3      Basophils, Absolute 0.03 10*3/mm3      Immature Grans, Absolute 0.02 10*3/mm3      nRBC 0.0 /100 WBC           Imaging Results (Last 24 Hours)       Procedure Component Value Units Date/Time    CT Head Without Contrast [[id number]] Collected: 09/17/23 1132     Updated: 09/17/23 1139    Narrative:      EXAMINATION:  CT HEAD WO CONTRAST-  9/17/2023 11:05 AM CDT     HISTORY: Right arm and leg numbness and weakness since last night.     TECHNIQUE: Multiple axial images were obtained through the brain without  contrast infusion. Multiplanar images were reconstructed.     DLP: 712 mGy-cm. Automated dosage reduction technique was utilized to  reduce patient dosage.     COMPARISON: No comparison study.     FINDINGS: There is a small area of cortical low density in the left  frontal-parietal convexity region (axial image 23 series 3 and coronal  image 9 series 7). There are no hemorrhage, edema or mass effect. There  is a 7 mm CSF density lesion in the left basal ganglia region  anteriorly. The ventricular system is nondilated. There is mild vascular  calcification. The paranasal sinuses and mastoid air cells are clear. No  calvarial fracture is seen.          Impression:      1. A small area of cortical low density in the left frontal-parietal  convexity may represent a small infarct.  2. No hemorrhage, edema or mass effect.  3. A 7 mm area of CSF  density in the left basal ganglia region  anteriorly may represent a small chronic lacunar infarct or a dilated  perivascular space.        The full report of this exam was immediately signed and available to the  emergency room. The patient is currently in the emergency room.     This report was finalized on 09/17/2023 11:36 by Dr. Oral Silveira MD.    XR Chest 1 View [[id number]] Collected: 09/17/23 1118     Updated: 09/17/23 1122    Narrative:      EXAMINATION:  XR CHEST 1 VW-  9/17/2023 10:48 AM CDT     HISTORY: Stroke evaluation.     COMPARISON: No comparison study.     TECHNIQUE: Single view AP image.     FINDINGS:  The lungs are expanded bilaterally and clear. There is stable  mild elevation/eventration of the right hemidiaphragm. The pleural  spaces are clear. Heart size is normal. No acute bony abnormality is  seen.          Impression:      No active disease is seen.        This report was finalized on 09/17/2023 11:19 by Dr. Oral Silveira MD.          I have personally reviewed and interpreted the radiology studies and ECG obtained at time of admission.     Assessment / Plan   Assessment:   Active Hospital Problems    Diagnosis     **Stroke     Cerebrovascular accident (CVA) of left thalamus     Sequela of lacunar infarction     Elevated blood pressure reading     Tobacco abuse      Treatment Plan  The patient will be admitted to my service here at [facility].   Vitals every 4 hours with neuro checks  Up with assistance, fall precautions  Cardiac diet, passed bedside swallow evaluation  CT brain and CTA neck and brain noted  Neurology input noted  IVF saline lock  A1C/Lipid panel check  MRI brain   Echocardiogram 2D  ASA 81 mg po daily  Lipitor 80 mg po daily  PT/OT evaluations  Smoking cessation advised    SBP > Labetalol 10 mg IVP prn SBP over 220 mmHg    DVT prophylaxis > SCD      Medical Decision Making  Number and Complexity of problems: 3 complex medical problems  Differential  Diagnosis: None    Conditions and Status        Condition is unchanged.     [facility] Data  External documents reviewed: N/A  Cardiac tracing (EKG, telemetry) interpretation: NSR 92 bmp  Radiology interpretation: See above  Labs reviewed: See above  Any tests that were considered but not ordered: None     Decision rules/scores evaluated (example [id number], Wells, etc): N/A     Discussed with: Patient     Care Planning  Shared decision making: Patient  Code status and discussions: Full code    Disposition  Social Determinants of Health that impact treatment or disposition: none  Estimated length of stay is to be determined.     I confirmed that the patient's advanced care plan is present, code status is documented, and a surrogate decision maker is listed in the patient's medical record.     The patient's surrogate decision maker is Alisha Silveira, spouse.     The patient was seen and examined by me on 9/17/2023 at 1305.    Electronically signed by Lopez Phelps MD, 09/17/23, 13:05 CDT.

## 2023-09-17 NOTE — ED PROVIDER NOTES
Subjective   History of Present Illness  63-year-old male presents to the ER with complaint of right arm and leg numbness and weakness.  Long history of smoking, does not routinely follow with a physician and is not aware of any significant past medical history.  Does not take any medications daily.  Patient was last known well at 9 PM last night, approximately 13.5 hours prior to arrival.  Patient states he fell asleep at the computer.  He was setting up.  He woke up around midnight to walk to bed and fell down secondary to weakness of his right leg.  He was able to get to bed.  Woke up this morning continue to complain of numb/tingling sensation to his right arm and leg.  Also states he is having difficulty controlling his right arm.  Has had abnormal gait.  He denies facial asymmetry, slurred speech, difficulty swallowing, confusion.  No headache.  No recent head injury.  Right symptoms as moderate severity with no aggravating alleviating factors.  Upon arrival to the ED, NIH 4.    History provided by:  Patient    Review of Systems   All other systems reviewed and are negative.    History reviewed. No pertinent past medical history.    No Known Allergies    History reviewed. No pertinent surgical history.    Family History   Problem Relation Age of Onset    No Known Problems Father     No Known Problems Mother        Social History     Socioeconomic History    Marital status:    Tobacco Use    Smoking status: Every Day    Smokeless tobacco: Never   Vaping Use    Vaping Use: Never used           Objective   Physical Exam  Vitals and nursing note reviewed.   Constitutional:       Appearance: Normal appearance. He is normal weight.   HENT:      Head: Normocephalic and atraumatic.      Nose: Nose normal. No congestion or rhinorrhea.      Mouth/Throat:      Mouth: Mucous membranes are moist.   Eyes:      General: No visual field deficit.     Extraocular Movements: Extraocular movements intact.       Conjunctiva/sclera: Conjunctivae normal.      Pupils: Pupils are equal, round, and reactive to light.   Cardiovascular:      Rate and Rhythm: Normal rate and regular rhythm.      Pulses: Normal pulses.      Heart sounds: Normal heart sounds. No murmur heard.  Pulmonary:      Effort: Pulmonary effort is normal.      Breath sounds: Normal breath sounds. No wheezing, rhonchi or rales.   Abdominal:      General: Abdomen is flat. Bowel sounds are normal.      Palpations: Abdomen is soft.   Skin:     General: Skin is warm and dry.      Capillary Refill: Capillary refill takes less than 2 seconds.      Findings: No bruising.   Neurological:      Mental Status: He is alert and oriented to person, place, and time.      GCS: GCS eye subscore is 4. GCS verbal subscore is 5. GCS motor subscore is 6.      Cranial Nerves: No dysarthria or facial asymmetry.      Sensory: Sensory deficit present.      Motor: Weakness and pronator drift present.      Coordination: Finger-Nose-Finger Test abnormal.      Gait: Gait abnormal.      Comments: Decreased sensation right hemibody as compared to the left  Strength 4/5 right upper extremity  Strength 4/5 right lower extremity    Abnormal finger-to-nose right arm       ECG 12 Lead      Date/Time: 9/17/2023 10:53 AM  Performed by: Michael Norris MD  Authorized by: Michael Norris MD   Interpreted by physician  Comments: Normal sinus rhythm, rate 92, left axis, LAFB, no acute ischemic changes         Lab Results (last 24 hours)       Procedure Component Value Units Date/Time    CBC & Differential [[id number]]  (Abnormal) Collected: 09/17/23 1100    Specimen: Blood from Arm, Left Updated: 09/17/23 1117    Narrative:      The following orders were created for panel order CBC & Differential.  Procedure                               Abnormality         Status                     ---------                               -----------         ------                     CBC Auto  Differential[[id number]]        Abnormal            Final result                 Please view results for these tests on the individual orders.    Comprehensive Metabolic Panel [[id number]]  (Abnormal) Collected: 09/17/23 1100    Specimen: Blood from Arm, Left Updated: 09/17/23 1147     Glucose 161 mg/dL      BUN 15 mg/dL      Creatinine 1.01 mg/dL      Sodium 138 mmol/L      Potassium 4.2 mmol/L      Comment: Slight hemolysis detected by analyzer. Results may be affected.        Chloride 105 mmol/L      CO2 23.0 mmol/L      Calcium 9.2 mg/dL      Total Protein 7.3 g/dL      Albumin 4.1 g/dL      ALT (SGPT) 10 U/L      AST (SGOT) 13 U/L      Comment: Slight hemolysis detected by analyzer. Results may be affected.        Alkaline Phosphatase 83 U/L      Total Bilirubin 0.5 mg/dL      Globulin 3.2 gm/dL      A/G Ratio 1.3 g/dL      BUN/Creatinine Ratio 14.9     Anion Gap 10.0 mmol/L      eGFR 84.6 mL/min/1.73     Narrative:      GFR Normal >60  Chronic Kidney Disease <60  Kidney Failure <15      Protime-INR [[id number]]  (Abnormal) Collected: 09/17/23 1100    Specimen: Blood from Arm, Left Updated: 09/17/23 1133     Protime 14.4 Seconds      INR 1.11    CBC Auto Differential [[id number]]  (Abnormal) Collected: 09/17/23 1100    Specimen: Blood from Arm, Left Updated: 09/17/23 1117     WBC 6.96 10*3/mm3      RBC 5.14 10*6/mm3      Hemoglobin 15.7 g/dL      Hematocrit 47.2 %      MCV 91.8 fL      MCH 30.5 pg      MCHC 33.3 g/dL      RDW 11.9 %      RDW-SD 40.5 fl      MPV 11.1 fL      Platelets 220 10*3/mm3      Neutrophil % 67.5 %      Lymphocyte % 22.1 %      Monocyte % 8.8 %      Eosinophil % 0.9 %      Basophil % 0.4 %      Immature Grans % 0.3 %      Neutrophils, Absolute 4.70 10*3/mm3      Lymphocytes, Absolute 1.54 10*3/mm3      Monocytes, Absolute 0.61 10*3/mm3      Eosinophils, Absolute 0.06 10*3/mm3      Basophils, Absolute 0.03 10*3/mm3      Immature Grans, Absolute 0.02 10*3/mm3      nRBC 0.0 /100 WBC      Magnesium [[id number]]  (Normal) Collected: 09/17/23 1100    Specimen: Blood from Arm, Left Updated: 09/17/23 1217     Magnesium 2.2 mg/dL          CT Head Without Contrast    Result Date: 9/17/2023  Narrative: EXAMINATION:  CT HEAD WO CONTRAST-  9/17/2023 11:05 AM CDT  HISTORY: Right arm and leg numbness and weakness since last night.  TECHNIQUE: Multiple axial images were obtained through the brain without contrast infusion. Multiplanar images were reconstructed.  DLP: 712 mGy-cm. Automated dosage reduction technique was utilized to reduce patient dosage.  COMPARISON: No comparison study.  FINDINGS: There is a small area of cortical low density in the left frontal-parietal convexity region (axial image 23 series 3 and coronal image 9 series 7). There are no hemorrhage, edema or mass effect. There is a 7 mm CSF density lesion in the left basal ganglia region anteriorly. The ventricular system is nondilated. There is mild vascular calcification. The paranasal sinuses and mastoid air cells are clear. No calvarial fracture is seen.       Impression: 1. A small area of cortical low density in the left frontal-parietal convexity may represent a small infarct. 2. No hemorrhage, edema or mass effect. 3. A 7 mm area of CSF density in the left basal ganglia region anteriorly may represent a small chronic lacunar infarct or a dilated perivascular space.   The full report of this exam was immediately signed and available to the emergency room. The patient is currently in the emergency room.  This report was finalized on 09/17/2023 11:36 by Dr. Oral Silveira MD.    XR Chest 1 View    Result Date: 9/17/2023  Narrative: EXAMINATION:  XR CHEST 1 VW-  9/17/2023 10:48 AM CDT  HISTORY: Stroke evaluation.  COMPARISON: No comparison study.  TECHNIQUE: Single view AP image.  FINDINGS:  The lungs are expanded bilaterally and clear. There is stable mild elevation/eventration of the right hemidiaphragm. The pleural spaces are clear. Heart  size is normal. No acute bony abnormality is seen.       Impression: No active disease is seen.   This report was finalized on 09/17/2023 11:19 by Dr. Oral Silveira MD.       ED Course  ED Course as of 09/17/23 1313   Sun Sep 17, 2023   1302 63-year-old male with long history of smoking no other known past medical history presents to the emergency department with right arm and leg numbness and mild weakness, gait abnormality.  Last known well greater than 12 hours prior to arrival.  NIH 4.  He was outside window for tPA.  NIH less than 6, did not suspect LVO not candidate for intervention.  CT head ordered, revealed small area of cortical low density in left frontoparietal region which may represent small stroke.  Will need admission to the hospitalist for inpatient stroke evaluation. [AW]      ED Course User Index  [AW] Michael Norris MD                                           Medical Decision Making  Amount and/or Complexity of Data Reviewed  Labs: ordered.  Radiology: ordered.  ECG/medicine tests: ordered and independent interpretation performed.    Risk  Prescription drug management.        Final diagnoses:   Cerebrovascular accident (CVA), unspecified mechanism       ED Disposition  ED Disposition       ED Disposition   Decision to Admit    Condition   --    Comment   Level of Care: Remote Telemetry [26]   Diagnosis: Stroke [046367]   Admitting Physician: SRAVANTHI HANNA [[id number]]   Attending Physician: SRAVANTHI HANNA [[id number]]   Isolate for COVID?: No [0]   Certification: I Certify That Inpatient Hospital Services Are Medically Necessary For Greater Than 2 Midnights                 No follow-up provider specified.       Medication List      No changes were made to your prescriptions during this visit.            Michael Norris MD  09/17/23 3858

## 2023-09-17 NOTE — ED NOTES
Nursing report ED to floor  [place]  63 y.o.  male    HPI:   Chief Complaint   Patient presents with    Numbness       Admitting doctor:   Lopez Phelps MD    Consulting provider(s):  Consults       No orders found from 8/19/2023 to 9/18/2023.             Admitting diagnosis:   The encounter diagnosis was Cerebrovascular accident (CVA), unspecified mechanism.    Code status:   Current Code Status       Date Active Code Status Order ID Comments User Context       Not on file            Allergies:   Patient has no known allergies.    Intake and Output  No intake or output data in the 24 hours ending 09/17/23 1322    Weight:       09/17/23  1044   Weight: 99.3 kg (219 lb)       Most recent vitals:   Vitals:    09/17/23 1231 09/17/23 1246 09/17/23 1301 09/17/23 1316   BP: 146/89 152/84 173/97 172/100   Pulse: 85 81 85 80   Resp:       Temp:       SpO2: 96% 96% 97% 95%   Weight:       Height:         Oxygen Therapy: .    Active LDAs/IV Access:   Lines, Drains & Airways       Active LDAs       Name Placement date Placement time Site Days    Peripheral IV 09/17/23 1058 Left;Posterior Hand 09/17/23  1058  Hand  less than 1                    Labs (abnormal labs have a star):   Labs Reviewed   COMPREHENSIVE METABOLIC PANEL - Abnormal; Notable for the following components:       Result Value    Glucose 161 (*)     All other components within normal limits    Narrative:     GFR Normal >60  Chronic Kidney Disease <60  Kidney Failure <15     PROTIME-INR - Abnormal; Notable for the following components:    INR 1.11 (*)     All other components within normal limits   CBC WITH AUTO DIFFERENTIAL - Abnormal; Notable for the following components:    RDW 11.9 (*)     All other components within normal limits   MAGNESIUM - Normal   CBC AND DIFFERENTIAL    Narrative:     The following orders were created for panel order CBC & Differential.  Procedure                               Abnormality         Status                      ---------                               -----------         ------                     CBC Auto Differential[[id number]]        Abnormal            Final result                 Please view results for these tests on the individual orders.       Meds given in ED:   Medications   sodium chloride 0.9 % flush 10 mL (has no administration in time range)           NIH Stroke Scale:  Interval: baseline    Isolation/Infection(s):  No active isolations   No active infections     COVID Testing  Collected .  Resulted .    Nursing report ED to floor:  Mental status: .alert and oriented  Ambulatory status: .with assist, weakness noted in right leg  Precautions: .    ED nurse phone extentsion- ..

## 2023-09-17 NOTE — PLAN OF CARE
Goal Outcome Evaluation:      Pt A/OX4, resp e/u, HHR, MERLOS, PPP. Pt has no complaints of pain. Pt's MRI was possitive for CVA. Pt passed swallow study, Cardiac ADA diet given. Pt has no skin issues noted. Wife at bedside. Stroke education provided to pt. Pt oriented to room. Bed in low position, call light in reach.      Liya , RN

## 2023-09-18 PROBLEM — E78.5 HYPERLIPIDEMIA: Status: ACTIVE | Noted: 2023-09-18

## 2023-09-18 LAB
ALBUMIN SERPL-MCNC: 4.3 G/DL (ref 3.5–5.2)
ALBUMIN/GLOB SERPL: 1.3 G/DL
ALP SERPL-CCNC: 82 U/L (ref 39–117)
ALT SERPL W P-5'-P-CCNC: 11 U/L (ref 1–41)
ANION GAP SERPL CALCULATED.3IONS-SCNC: 11 MMOL/L (ref 5–15)
AST SERPL-CCNC: 12 U/L (ref 1–40)
BILIRUB SERPL-MCNC: 0.9 MG/DL (ref 0–1.2)
BUN SERPL-MCNC: 15 MG/DL (ref 8–23)
BUN/CREAT SERPL: 15.8 (ref 7–25)
CALCIUM SPEC-SCNC: 9.4 MG/DL (ref 8.6–10.5)
CHLORIDE SERPL-SCNC: 105 MMOL/L (ref 98–107)
CHOLEST SERPL-MCNC: 215 MG/DL (ref 0–200)
CO2 SERPL-SCNC: 23 MMOL/L (ref 22–29)
CREAT SERPL-MCNC: 0.95 MG/DL (ref 0.76–1.27)
DEPRECATED RDW RBC AUTO: 41.9 FL (ref 37–54)
EGFRCR SERPLBLD CKD-EPI 2021: 91.1 ML/MIN/1.73
ERYTHROCYTE [DISTWIDTH] IN BLOOD BY AUTOMATED COUNT: 12.3 % (ref 12.3–15.4)
GLOBULIN UR ELPH-MCNC: 3.3 GM/DL
GLUCOSE BLDC GLUCOMTR-MCNC: 109 MG/DL (ref 70–130)
GLUCOSE SERPL-MCNC: 94 MG/DL (ref 65–99)
HBA1C MFR BLD: 5.7 % (ref 4.8–5.6)
HCT VFR BLD AUTO: 49.9 % (ref 37.5–51)
HDLC SERPL-MCNC: 29 MG/DL (ref 40–60)
HGB BLD-MCNC: 16.4 G/DL (ref 13–17.7)
LDLC SERPL CALC-MCNC: 160 MG/DL (ref 0–100)
LDLC/HDLC SERPL: 5.46 {RATIO}
MCH RBC QN AUTO: 30.6 PG (ref 26.6–33)
MCHC RBC AUTO-ENTMCNC: 32.9 G/DL (ref 31.5–35.7)
MCV RBC AUTO: 93.1 FL (ref 79–97)
PLATELET # BLD AUTO: 212 10*3/MM3 (ref 140–450)
PMV BLD AUTO: 11.2 FL (ref 6–12)
POTASSIUM SERPL-SCNC: 4.2 MMOL/L (ref 3.5–5.2)
PROT SERPL-MCNC: 7.6 G/DL (ref 6–8.5)
RBC # BLD AUTO: 5.36 10*6/MM3 (ref 4.14–5.8)
SODIUM SERPL-SCNC: 139 MMOL/L (ref 136–145)
TRIGL SERPL-MCNC: 139 MG/DL (ref 0–150)
VLDLC SERPL-MCNC: 26 MG/DL (ref 5–40)
WBC NRBC COR # BLD: 7.1 10*3/MM3 (ref 3.4–10.8)

## 2023-09-18 PROCEDURE — 80061 LIPID PANEL: CPT | Performed by: FAMILY MEDICINE

## 2023-09-18 PROCEDURE — 92523 SPEECH SOUND LANG COMPREHEN: CPT

## 2023-09-18 PROCEDURE — 99233 SBSQ HOSP IP/OBS HIGH 50: CPT | Performed by: PSYCHIATRY & NEUROLOGY

## 2023-09-18 PROCEDURE — 97162 PT EVAL MOD COMPLEX 30 MIN: CPT

## 2023-09-18 PROCEDURE — 85027 COMPLETE CBC AUTOMATED: CPT | Performed by: FAMILY MEDICINE

## 2023-09-18 PROCEDURE — 80053 COMPREHEN METABOLIC PANEL: CPT | Performed by: FAMILY MEDICINE

## 2023-09-18 PROCEDURE — 83036 HEMOGLOBIN GLYCOSYLATED A1C: CPT | Performed by: FAMILY MEDICINE

## 2023-09-18 PROCEDURE — 97165 OT EVAL LOW COMPLEX 30 MIN: CPT

## 2023-09-18 RX ORDER — LOSARTAN POTASSIUM 50 MG/1
50 TABLET ORAL
Status: DISCONTINUED | OUTPATIENT
Start: 2023-09-18 | End: 2023-09-19 | Stop reason: HOSPADM

## 2023-09-18 RX ADMIN — LOSARTAN POTASSIUM 50 MG: 50 TABLET, FILM COATED ORAL at 17:26

## 2023-09-18 RX ADMIN — ATORVASTATIN CALCIUM 80 MG: 40 TABLET, FILM COATED ORAL at 21:40

## 2023-09-18 RX ADMIN — Medication 10 ML: at 21:40

## 2023-09-18 RX ADMIN — Medication 10 ML: at 08:30

## 2023-09-18 RX ADMIN — ASPIRIN 81 MG: 81 TABLET, CHEWABLE ORAL at 08:30

## 2023-09-18 NOTE — PLAN OF CARE
Goal Outcome Evaluation:           Progress: improving  Outcome Evaluation: Patient resting in room, family at bedside, no complaints at this time, VSS, awaiting echo

## 2023-09-18 NOTE — THERAPY DISCHARGE NOTE
Acute Care - Occupational Therapy Discharge  [facility]    Patient Name: Des Silveira  : 1960    MRN: [id number]                              Today's Date: 2023       Admit Date: 2023    Visit Dx:     ICD-10-CM ICD-9-CM   1. Cerebrovascular accident (CVA), unspecified mechanism  I63.9 434.91   2. Cognitive changes  R41.89 799.59     Patient Active Problem List   Diagnosis    Stroke    Cerebrovascular accident (CVA) of left thalamus    Sequela of lacunar infarction    Elevated blood pressure reading    Tobacco abuse     Past Medical History:   Diagnosis Date    History of transfusion 1964    Stroke 2023     History reviewed. No pertinent surgical history.   General Information       Row Name 23 0759          OT Time and Intention    Document Type evaluation  C/o RUE and RLE numbness, Dx:stroke, L thalamus  -CS (r) MB (t) CS (c)     Mode of Treatment occupational therapy  -CS (r) MB (t) CS (c)       Row Name 23 0759          General Information    Patient Profile Reviewed yes  -CS (r) MB (t) CS (c)     Prior Level of Function independent:;ADL's;home management;cooking;cleaning;driving;shopping;using stairs;work  -CS (r) MB (t) CS (c)     Existing Precautions/Restrictions fall  -CS (r) MB (t) CS (c)     Barriers to Rehab --  -CS (r) MB (t) CS (c)       Row Name 23 0759          Living Environment    People in Home spouse  -CS (r) MB (t) CS (c)       Row Name 23 0759          Home Main Entrance    Number of Stairs, Main Entrance none  -CS (r) MB (t) CS (c)     Stair Railings, Main Entrance none  -CS (r) MB (t) CS (c)       Row Name 23 0759          Stairs Within Home, Primary    Number of Stairs, Within Home, Primary none  -CS (r) MB (t) CS (c)     Stair Railings, Within Home, Primary none  -CS (r) MB (t) CS (c)       Row Name 23 0751          Cognition    Orientation Status (Cognition) oriented x 4  -CS (r) MB (t) CS (c)       Row Name 23 0759           Safety Issues, Functional Mobility    Safety Issues Affecting Function (Mobility) insight into deficits/self-awareness  -CS (r) MB (t) CS (c)     Impairments Affecting Function (Mobility) balance  -CS (r) MB (t) CS (c)               User Key  (r) = Recorded By, (t) = Taken By, (c) = Cosigned By      Initials Name Provider Type    CS Alisha Titus S, OTR/L, CNT Occupational Therapist    Clau English, OT Student OT Student                   Mobility/ADL's       Row Name 09/18/23 [facility]          Bed Mobility    Bed Mobility scooting/bridging  -CS (r) MB (t) CS (c)     Scooting/Bridging [place] (Bed Mobility) independent  -CS (r) MB (t) CS (c)     Assistive Device (Bed Mobility) head of bed elevated;bed rails  -CS (r) MB (t) CS (c)       Row Name 09/18/23 [facility]          Transfers    Transfers sit-stand transfer;stand-sit transfer  -CS (r) MB (t) CS (c)       Row Name 09/18/23 [facility]          Bed-Chair Transfer    Bed-Chair [place] (Transfers) --  -CS (r) MB (t) CS (c)       Row Name 09/18/23 [facility]          Sit-Stand Transfer    Sit-Stand [place] (Transfers) supervision  -CS (r) MB (t) CS (c)       Row Name 09/18/23 [facility]          Stand-Sit Transfer    Stand-Sit [place] (Transfers) supervision  -CS (r) MB (t) CS (c)       Row Name 09/18/23 [facility]          Functional Mobility    Functional Mobility- Ind. Level supervision required  -CS (r) MB (t) CS (c)     Functional Mobility- Comment in hallway  -CS (r) MB (t) CS (c)       Row Name 09/18/23 [facility]          Activities of Daily Living    BADL Assessment/Intervention lower body dressing  -CS (r) MB (t) CS (c)       Row Name 09/18/23 [facility]          Lower Body Dressing Assessment/Training    [place] Level (Lower Body Dressing) don;doff;socks;independent  -CS (r) MB (t) CS (c)     Position (Lower Body Dressing) edge of bed sitting;unsupported sitting  -CS (r) MB (t) CS (c)               User Key  (r) = Recorded By, (t) = Taken By, (c) = Cosigned  By      Initials Name Provider Type    CS Alisha Titus, OTR/L, CNT Occupational Therapist    Clau English, OT Student OT Student                   Obj/Interventions       Row Name 09/18/23 0759          Sensory Assessment (Somatosensory)    Sensory Assessment (Somatosensory) UE sensation intact  -CS (r) MB (t) CS (c)     Sensory Subjective Reports tingling  RUE  -CS (r) MB (t) CS (c)       Row Name 09/18/23 0759          Vision Assessment/Intervention    Visual Impairment/Limitations WNL  -CS (r) MB (t) CS (c)       Row Name 09/18/23 0759          Range of Motion Comprehensive    General Range of Motion bilateral upper extremity ROM WNL  -CS (r) MB (t) CS (c)       Row Name 09/18/23 0759          Strength Comprehensive (MMT)    Comment, General Manual Muscle Testing (MMT) Assessment BUE 5/5  -CS (r) MB (t) CS (c)       Row Name 09/18/23 0759          Balance    Balance Assessment sitting static balance;standing static balance;standing dynamic balance;sit to stand dynamic balance  -CS (r) MB (t) CS (c)     Static Sitting Balance independent  -CS (r) MB (t) CS (c)     Position, Sitting Balance unsupported  -CS (r) MB (t) CS (c)     Sit to Stand Dynamic Balance supervision  -CS (r) MB (t) CS (c)     Static Standing Balance supervision  -CS (r) MB (t) CS (c)     Dynamic Standing Balance supervision  -CS (r) MB (t) CS (c)     Position/Device Used, Standing Balance unsupported  -CS (r) MB (t) CS (c)               User Key  (r) = Recorded By, (t) = Taken By, (c) = Cosigned By      Initials Name Provider Type    CS Alisha Titus, OTR/L, [place] Occupational Therapist    Clau English, OT Student OT Student                   Goals/Plan    No documentation.                  Clinical Impression       Row Name 09/18/23 0759          Pain Assessment    Pretreatment Pain Rating 0/10 - no pain  -CS (r) MB (t) CS (c)     Posttreatment Pain Rating 0/10 - no pain  -CS (r) MB (t) CS (c)       Row Name 09/18/23  0759          Plan of Care Review    Plan of Care Reviewed With patient  -CS (r) MB (t) CS (c)     Progress improving  -CS (r) MB (t) CS (c)     Outcome Evaluation OT eval complete. Patient is alert and oriented x4. Reports slight numbness/tingling in RUE but it's not as bad as when he came to hospital. No MMT strength deficits identified. Pt completed sit <> stand t/f with S. Pt able to IND don/doff socks seated at EOB. Pt amb in hallway and then back to room seated in chair with S. At this time, OT isn't deemed necessary d/t pt being at baseline functionally with ADLs. However, will defer to PT at this time d/t balance deficits. Anticipate further d/c home and home with assist.  -CS (r) MB (t) CS (c)       Row Name 09/18/23 0759          Therapy Assessment/Plan (OT)    Patient/Family Therapy Goal Statement (OT) to go home  -CS (r) MB (t) CS (c)     Criteria for Skilled Therapeutic Interventions Met (OT) no problems identified which require skilled intervention;does not meet criteria for skilled intervention  -CS (r) MB (t) CS (c)     Therapy Frequency (OT) evaluation only  -CS (r) MB (t) CS (c)       Row Name 09/18/23 0759          Therapy Plan Review/Discharge Plan (OT)    Anticipated Discharge Disposition (OT) home;home with assist  -CS (r) MB (t) CS (c)       Row Name 09/18/23 0759          Positioning and Restraints    Pre-Treatment Position in bed  -CS (r) MB (t) CS (c)     Post Treatment Position chair  -CS (r) MB (t) CS (c)     In Chair sitting;call light within reach;encouraged to call for assist  -CS (r) MB (t) CS (c)               User Key  (r) = Recorded By, (t) = Taken By, (c) = Cosigned By      Initials Name Provider Type    CS Alisha Titus S, OTR/L, CNT Occupational Therapist    Clau English, OT Student OT Student                   Outcome Measures       Row Name 09/18/23 0759          How much help from another is currently needed...    Putting on and taking off regular lower body  clothing? 4  -CS (r) MB (t) CS (c)     Bathing (including washing, rinsing, and drying) 4  -CS (r) MB (t) CS (c)     Toileting (which includes using toilet bed pan or urinal) 4  -CS (r) MB (t) CS (c)     Putting on and taking off regular upper body clothing 4  -CS (r) MB (t) CS (c)     Taking care of personal grooming (such as brushing teeth) 4  -CS (r) MB (t) CS (c)     Eating meals 4  -CS (r) MB (t) CS (c)     AM-PAC 6 Clicks Score (OT) 24  -CS (r) MB (t)       Row Name 09/18/23 0759          Modified Bureau Scale    Pre-Stroke Modified Bureau Scale 1 - No significant disability despite symptoms.  Able to carry out all usual duties and activities.  -CS (r) MB (t) CS (c)     Modified [place] Scale 1 - No significant disability despite symptoms.  Able to carry out all usual duties and activities.  -CS (r) MB (t) CS (c)       Row Name 09/18/23 0759          Functional Assessment    Outcome Measure Options AM-PAC 6 Clicks Daily Activity (OT);Modified Bureau  -CS (r) MB (t) CS (c)               User Key  (r) = Recorded By, (t) = Taken By, (c) = Cosigned By      Initials Name Provider Type    CS Alisha Titus, OTR/L, CNT Occupational Therapist    Clau English, OT Student OT Student                  Occupational Therapy Education       Title: PT OT SLP Therapies (Done)       Topic: Occupational Therapy (Done)       Point: ADL training (Done)       Description:   Instruct learner(s) on proper safety adaptation and remediation techniques during self care or transfers.   Instruct in proper use of assistive devices.                  Learning Progress Summary             Patient Acceptance, E, VU by MB at 9/18/2023 0840                         Point: Home exercise program (Done)       Description:   Instruct learner(s) on appropriate technique for monitoring, assisting and/or progressing therapeutic exercises/activities.                  Learning Progress Summary             Patient Acceptance, E, VU by MB at  9/18/2023 0840                         Point: Precautions (Done)       Description:   Instruct learner(s) on prescribed precautions during self-care and functional transfers.                  Learning Progress Summary             Patient Acceptance, E, VU by MB at 9/18/2023 0840                         Point: Body mechanics (Done)       Description:   Instruct learner(s) on proper positioning and spine alignment during self-care, functional mobility activities and/or exercises.                  Learning Progress Summary             Patient Acceptance, E, VU by MB at 9/18/2023 0840                                         User Key       Initials Effective Dates Name Provider Type Discipline    MB 08/04/23 -  Clau Nolasco OT Student OT Student OT                  OT Recommendation and Plan  Therapy Frequency (OT): evaluation only  Plan of Care Review  Plan of Care Reviewed With: patient  Progress: improving  Outcome Evaluation: OT swati complete. Patient is alert and oriented x4. Reports slight numbness/tingling in RUE but it's not as bad as when he came to hospital. No MMT strength deficits identified. Pt completed sit <> stand t/f with S. Pt able to IND don/doff socks seated at EOB. Pt amb in hallway and then back to room seated in chair with S. At this time, OT isn't deemed necessary d/t pt being at baseline functionally with ADLs. However, will defer to PT at this time d/t balance deficits. Anticipate further d/c home and home with assist.  Plan of Care Reviewed With: patient  Outcome Evaluation: OT eval complete. Patient is alert and oriented x4. Reports slight numbness/tingling in RUE but it's not as bad as when he came to hospital. No MMT strength deficits identified. Pt completed sit <> stand t/f with S. Pt able to IND don/doff socks seated at EOB. Pt amb in hallway and then back to room seated in chair with S. At this time, OT isn't deemed necessary d/t pt being at baseline functionally with ADLs.  However, will defer to PT at this time d/t balance deficits. Anticipate further d/c home and home with assist.     Time Calculation:         Time Calculation- OT       Row Name 09/18/23 0838             Time Calculation- OT    OT Start Time 0759  +10 min chart review  -CS (r) MB (t) CS (c)      OT Stop Time 0816  -CS (r) MB (t) CS (c)      OT Time Calculation (min) 17 min  -CS (r) MB (t)      OT Received On 09/18/23  -CS (r) MB (t) CS (c)         Untimed Charges    OT Eval/Re-eval Minutes 27  -CS (r) MB (t) CS (c)         Total Minutes    Untimed Charges Total Minutes 27  -CS (r) MB (t)       Total Minutes 27  -CS (r) MB (t)                User Key  (r) = Recorded By, (t) = Taken By, (c) = Cosigned By      Initials Name Provider Type    CS Alisha Titus S, OTR/L, CNT Occupational Therapist    Clau English, OT Student OT Student                         OT Discharge Summary  Anticipated Discharge Disposition (OT): home  Reason for Discharge: At baseline function  Outcomes Achieved: Other (One time eval only)  Discharge Destination: Home, Home with assist    Clau Nolasco OT Student  9/18/2023

## 2023-09-18 NOTE — PAYOR COMM NOTE
"ADMIT INPT 9-17-23  [id number]    [id number]    Des Silveira (63 y.o. Male)       Date of Birth   1960    Social Security Number       Address   [address] [id number]    Home Phone   [phone]    MRN   [id number]       [employer]   Other    Marital Status                               Admission Date   9/17/23    Admission Type   Emergency    Admitting Provider   Erlinda Aparicio MD    Attending Provider   Erlinda Aparicio MD    Department, Room/Bed   [facility] 3A, 338/1       Discharge Date       Discharge Disposition       Discharge Destination                                 Attending Provider: Erlinda Aparicio MD    Allergies: No Known Allergies    Isolation: None   Infection: None   Code Status: CPR    Ht: 180.3 cm (71\")   Wt: 99.3 kg (219 lb)    Admission Cmt: None   Principal Problem: Stroke [I63.9]                   Active Insurance as of 9/17/2023       Primary Coverage       Payor Plan Insurance Group Employer/Plan Group    [facility] [id number]       Payor Plan Address Payor Plan Phone Number Payor Plan Fax Number Effective Dates    [address] [address] [phone]  3/1/2022 - None Entered    [facility] [id number]         Subscriber Name Subscriber Birth Date Member ID       DES SILVEIRA 1960 [id number]                     Emergency Contacts        (Rel.) Home Phone Work Phone Mobile Phone    Alisha Silveira (Spouse) [phone] -- --                 History & Physical        Lopez Phelps MD at 09/17/23 1305              [facility]  HISTORY AND PHYSICAL    Date of Admission: 9/17/2023  Primary Care Physician: Provider, No Known    Subjective   Primary Historian: Patient    Chief Complaint: Right arm and leg numbness    History of Present Illness  63 year old male with no significant PMH that presents to the ER with complains of " "numbness in right upper and lower extremities of sudden onset last night at about 1 AM. He was not sure what it was and decided to come to the ER this morning as symptoms persisted. He presented 9 hours after the onset of symptoms. No prior episodes, no associated chest pain, palpitations or weakness.     Review of Systems   Otherwise complete ROS reviewed and negative except as mentioned in the HPI.    Past Medical History: History reviewed. No pertinent past medical history.  Past Surgical History:History reviewed. No pertinent surgical history.  Social History:  reports that he has been smoking. He has never used smokeless tobacco.    Family History: family history includes No Known Problems in his father and mother.       Allergies:  No Known Allergies    Medications:  Prior to Admission medications    Medication Sig Start Date End Date Taking? Authorizing Provider   fluticasone (FLONASE) 50 MCG/ACT nasal spray 2 sprays into the nostril(s) as directed by provider Daily. 5/12/23  Yes Frances Kwan APRN   cetirizine (zyrTEC) 10 MG tablet Take 1 tablet by mouth Daily. 5/12/23   Frances Kwan APRN   methylPREDNISolone (MEDROL) 4 MG dose pack Take as directed on package instructions. 5/12/23   Frances Kwan APRN     I have utilized all available immediate resources to obtain, update, or review the patient's current medications (including all prescriptions, over-the-counter products, herbals, cannabis/cannabidiol products, and vitamin/mineral/dietary (nutritional) supplements).    Objective     Vital Signs: BP (!) 191/92   Pulse 107   Temp 98.3 °F (36.8 °C)   Resp 18   Ht 180.3 cm (71\")   Wt 99.3 kg (219 lb)   SpO2 97%   BMI 30.54 kg/m²   Physical Exam  Constitutional:       General: He is not in acute distress.     Appearance: He is well-developed. He is not ill-appearing, toxic-appearing or diaphoretic.   HENT:      Head: Normocephalic and atraumatic.      Right Ear: " External ear normal.      Left Ear: External ear normal.      Nose: Nose normal.      Mouth/Throat:      Mouth: Mucous membranes are dry.   Eyes:      General:         Right eye: No discharge.         Left eye: No discharge.      Extraocular Movements: Extraocular movements intact.      Conjunctiva/sclera: Conjunctivae normal.      Pupils: Pupils are equal, round, and reactive to light.   Neck:      Vascular: No JVD.   Cardiovascular:      Rate and Rhythm: Regular rhythm. Tachycardia present.      Heart sounds: Normal heart sounds. No murmur heard.  Pulmonary:      Effort: Pulmonary effort is normal. No respiratory distress.      Breath sounds: Normal breath sounds. No wheezing or rales.   Chest:      Chest wall: No tenderness.   Abdominal:      General: Bowel sounds are normal. There is no distension.      Palpations: Abdomen is soft.      Tenderness: There is no abdominal tenderness. There is no guarding or rebound.   Musculoskeletal:         General: No tenderness or deformity. Normal range of motion.      Cervical back: Normal range of motion and neck supple. No rigidity.      Right lower leg: No edema.      Left lower leg: No edema.   Skin:     General: Skin is warm and dry.      Findings: No rash.   Neurological:      General: No focal deficit present.      Mental Status: He is alert and oriented to person, place, and time. Mental status is at baseline.      Cranial Nerves: No cranial nerve deficit.      Sensory: Sensory deficit present.      Motor: No abnormal muscle tone.      Deep Tendon Reflexes: Reflexes normal.   Psychiatric:         Mood and Affect: Mood normal.         Behavior: Behavior normal.      Results Reviewed:  Lab Results (last 24 hours)       Procedure Component Value Units Date/Time    Magnesium [[id number]]  (Normal) Collected: 09/17/23 1100    Specimen: Blood from Arm, Left Updated: 09/17/23 1217     Magnesium 2.2 mg/dL     Comprehensive Metabolic Panel [[id number]]  (Abnormal) Collected:  09/17/23 1100    Specimen: Blood from Arm, Left Updated: 09/17/23 1147     Glucose 161 mg/dL      BUN 15 mg/dL      Creatinine 1.01 mg/dL      Sodium 138 mmol/L      Potassium 4.2 mmol/L      Comment: Slight hemolysis detected by analyzer. Results may be affected.        Chloride 105 mmol/L      CO2 23.0 mmol/L      Calcium 9.2 mg/dL      Total Protein 7.3 g/dL      Albumin 4.1 g/dL      ALT (SGPT) 10 U/L      AST (SGOT) 13 U/L      Comment: Slight hemolysis detected by analyzer. Results may be affected.        Alkaline Phosphatase 83 U/L      Total Bilirubin 0.5 mg/dL      Globulin 3.2 gm/dL      A/G Ratio 1.3 g/dL      BUN/Creatinine Ratio 14.9     Anion Gap 10.0 mmol/L      eGFR 84.6 mL/min/1.73     Narrative:      GFR Normal >60  Chronic Kidney Disease <60  Kidney Failure <15      Protime-INR [[id number]]  (Abnormal) Collected: 09/17/23 1100    Specimen: Blood from Arm, Left Updated: 09/17/23 1133     Protime 14.4 Seconds      INR 1.11    CBC & Differential [[id number]]  (Abnormal) Collected: 09/17/23 1100    Specimen: Blood from Arm, Left Updated: 09/17/23 1117    Narrative:      The following orders were created for panel order CBC & Differential.  Procedure                               Abnormality         Status                     ---------                               -----------         ------                     CBC Auto Differential[[id number]]        Abnormal            Final result                 Please view results for these tests on the individual orders.    CBC Auto Differential [[id number]]  (Abnormal) Collected: 09/17/23 1100    Specimen: Blood from Arm, Left Updated: 09/17/23 1117     WBC 6.96 10*3/mm3      RBC 5.14 10*6/mm3      Hemoglobin 15.7 g/dL      Hematocrit 47.2 %      MCV 91.8 fL      MCH 30.5 pg      MCHC 33.3 g/dL      RDW 11.9 %      RDW-SD 40.5 fl      MPV 11.1 fL      Platelets 220 10*3/mm3      Neutrophil % 67.5 %      Lymphocyte % 22.1 %      Monocyte % 8.8 %      Eosinophil % 0.9  %      Basophil % 0.4 %      Immature Grans % 0.3 %      Neutrophils, Absolute 4.70 10*3/mm3      Lymphocytes, Absolute 1.54 10*3/mm3      Monocytes, Absolute 0.61 10*3/mm3      Eosinophils, Absolute 0.06 10*3/mm3      Basophils, Absolute 0.03 10*3/mm3      Immature Grans, Absolute 0.02 10*3/mm3      nRBC 0.0 /100 WBC           Imaging Results (Last 24 Hours)       Procedure Component Value Units Date/Time    CT Head Without Contrast [[id number]] Collected: 09/17/23 1132     Updated: 09/17/23 1139    Narrative:      EXAMINATION:  CT HEAD WO CONTRAST-  9/17/2023 11:05 AM CDT     HISTORY: Right arm and leg numbness and weakness since last night.     TECHNIQUE: Multiple axial images were obtained through the brain without  contrast infusion. Multiplanar images were reconstructed.     DLP: 712 mGy-cm. Automated dosage reduction technique was utilized to  reduce patient dosage.     COMPARISON: No comparison study.     FINDINGS: There is a small area of cortical low density in the left  frontal-parietal convexity region (axial image 23 series 3 and coronal  image 9 series 7). There are no hemorrhage, edema or mass effect. There  is a 7 mm CSF density lesion in the left basal ganglia region  anteriorly. The ventricular system is nondilated. There is mild vascular  calcification. The paranasal sinuses and mastoid air cells are clear. No  calvarial fracture is seen.          Impression:      1. A small area of cortical low density in the left frontal-parietal  convexity may represent a small infarct.  2. No hemorrhage, edema or mass effect.  3. A 7 mm area of CSF density in the left basal ganglia region  anteriorly may represent a small chronic lacunar infarct or a dilated  perivascular space.        The full report of this exam was immediately signed and available to the  emergency room. The patient is currently in the emergency room.     This report was finalized on 09/17/2023 11:36 by Dr. Oral Silveira MD.    XR Chest 1  View [[id number]] Collected: 09/17/23 1118     Updated: 09/17/23 1122    Narrative:      EXAMINATION:  XR CHEST 1 VW-  9/17/2023 10:48 AM CDT     HISTORY: Stroke evaluation.     COMPARISON: No comparison study.     TECHNIQUE: Single view AP image.     FINDINGS:  The lungs are expanded bilaterally and clear. There is stable  mild elevation/eventration of the right hemidiaphragm. The pleural  spaces are clear. Heart size is normal. No acute bony abnormality is  seen.          Impression:      No active disease is seen.        This report was finalized on 09/17/2023 11:19 by Dr. Oral Silveira MD.          I have personally reviewed and interpreted the radiology studies and ECG obtained at time of admission.     Assessment / Plan   Assessment:   Active Hospital Problems    Diagnosis     **Stroke     Cerebrovascular accident (CVA) of left thalamus     Sequela of lacunar infarction     Elevated blood pressure reading     Tobacco abuse      Treatment Plan  The patient will be admitted to my service here at [facility].   Vitals every 4 hours with neuro checks  Up with assistance, fall precautions  Cardiac diet, passed bedside swallow evaluation  CT brain and CTA neck and brain noted  Neurology input noted  IVF saline lock  A1C/Lipid panel check  MRI brain   Echocardiogram 2D  ASA 81 mg po daily  Lipitor 80 mg po daily  PT/OT evaluations  Smoking cessation advised    SBP > Labetalol 10 mg IVP prn SBP over 220 mmHg    DVT prophylaxis > SCD      Medical Decision Making  Number and Complexity of problems: 3 complex medical problems  Differential Diagnosis: None    Conditions and Status        Condition is unchanged.     [facility] Data  External documents reviewed: N/A  Cardiac tracing (EKG, telemetry) interpretation: NSR 92 bmp  Radiology interpretation: See above  Labs reviewed: See above  Any tests that were considered but not ordered: None     Decision rules/scores evaluated (example [id number], Wells, etc): N/A      Discussed with: Patient     Care Planning  Shared decision making: Patient  Code status and discussions: Full code    Disposition  Social Determinants of Health that impact treatment or disposition: none  Estimated length of stay is to be determined.     I confirmed that the patient's advanced care plan is present, code status is documented, and a surrogate decision maker is listed in the patient's medical record.     The patient's surrogate decision maker is Alisha Silveira, spouse.     The patient was seen and examined by me on 9/17/2023 at 1305.    Electronically signed by Lopez Phelps MD, 09/17/23, 13:05 CDT.              Electronically signed by Lopez Phelps MD at 09/17/23 1805          Emergency Department Notes        Netta Leung RN at 09/17/23 1322          Nursing report ED to floor  Des Silveira  63 y.o.  male    HPI:   Chief Complaint   Patient presents with    Numbness       Admitting doctor:   Lopez Phelps MD    Consulting provider(s):  Consults       No orders found from 8/19/2023 to 9/18/2023.             Admitting diagnosis:   The encounter diagnosis was Cerebrovascular accident (CVA), unspecified mechanism.    Code status:   Current Code Status       Date Active Code Status Order ID Comments User Context       Not on file            Allergies:   Patient has no known allergies.    Intake and Output  No intake or output data in the 24 hours ending 09/17/23 1322    Weight:       09/17/23  1044   Weight: 99.3 kg (219 lb)       Most recent vitals:   Vitals:    09/17/23 1231 09/17/23 1246 09/17/23 1301 09/17/23 1316   BP: 146/89 152/84 173/97 172/100   Pulse: 85 81 85 80   Resp:       Temp:       SpO2: 96% 96% 97% 95%   Weight:       Height:         Oxygen Therapy: .    Active LDAs/IV Access:   Lines, Drains & Airways       Active LDAs       Name Placement date Placement time Site Days    Peripheral IV 09/17/23 1058 Left;Posterior Hand 09/17/23  1058  Hand  less than 1                     Labs (abnormal labs have a star):   Labs Reviewed   COMPREHENSIVE METABOLIC PANEL - Abnormal; Notable for the following components:       Result Value    Glucose 161 (*)     All other components within normal limits    Narrative:     GFR Normal >60  Chronic Kidney Disease <60  Kidney Failure <15     PROTIME-INR - Abnormal; Notable for the following components:    INR 1.11 (*)     All other components within normal limits   CBC WITH AUTO DIFFERENTIAL - Abnormal; Notable for the following components:    RDW 11.9 (*)     All other components within normal limits   MAGNESIUM - Normal   CBC AND DIFFERENTIAL    Narrative:     The following orders were created for panel order CBC & Differential.  Procedure                               Abnormality         Status                     ---------                               -----------         ------                     CBC Auto Differential[[id number]]        Abnormal            Final result                 Please view results for these tests on the individual orders.       Meds given in ED:   Medications   sodium chloride 0.9 % flush 10 mL (has no administration in time range)           NIH Stroke Scale:  Interval: baseline    Isolation/Infection(s):  No active isolations   No active infections     COVID Testing  Collected .  Resulted .    Nursing report ED to floor:  Mental status: .alert and oriented  Ambulatory status: .with assist, weakness noted in right leg  Precautions: .    ED nurse phone extentsion- ..       Electronically signed by Netta Leung RN at 09/17/23 1323       Michael Norris MD at 09/17/23 1114        Procedure Orders    1. ECG 12 Lead [[id number]] ordered by Michael Norris MD                 Subjective   History of Present Illness  63-year-old male presents to the ER with complaint of right arm and leg numbness and weakness.  Long history of smoking, does not routinely follow with a physician and is not aware of any  significant past medical history.  Does not take any medications daily.  Patient was last known well at 9 PM last night, approximately 13.5 hours prior to arrival.  Patient states he fell asleep at the computer.  He was setting up.  He woke up around midnight to walk to bed and fell down secondary to weakness of his right leg.  He was able to get to bed.  Woke up this morning continue to complain of numb/tingling sensation to his right arm and leg.  Also states he is having difficulty controlling his right arm.  Has had abnormal gait.  He denies facial asymmetry, slurred speech, difficulty swallowing, confusion.  No headache.  No recent head injury.  Right symptoms as moderate severity with no aggravating alleviating factors.  Upon arrival to the ED, NIH 4.    History provided by:  Patient    Review of Systems   All other systems reviewed and are negative.    History reviewed. No pertinent past medical history.    No Known Allergies    History reviewed. No pertinent surgical history.    Family History   Problem Relation Age of Onset    No Known Problems Father     No Known Problems Mother        Social History     Socioeconomic History    Marital status:    Tobacco Use    Smoking status: Every Day    Smokeless tobacco: Never   Vaping Use    Vaping Use: Never used           Objective   Physical Exam  Vitals and nursing note reviewed.   Constitutional:       Appearance: Normal appearance. He is normal weight.   HENT:      Head: Normocephalic and atraumatic.      Nose: Nose normal. No congestion or rhinorrhea.      Mouth/Throat:      Mouth: Mucous membranes are moist.   Eyes:      General: No visual field deficit.     Extraocular Movements: Extraocular movements intact.      Conjunctiva/sclera: Conjunctivae normal.      Pupils: Pupils are equal, round, and reactive to light.   Cardiovascular:      Rate and Rhythm: Normal rate and regular rhythm.      Pulses: Normal pulses.      Heart sounds: Normal heart sounds.  No murmur heard.  Pulmonary:      Effort: Pulmonary effort is normal.      Breath sounds: Normal breath sounds. No wheezing, rhonchi or rales.   Abdominal:      General: Abdomen is flat. Bowel sounds are normal.      Palpations: Abdomen is soft.   Skin:     General: Skin is warm and dry.      Capillary Refill: Capillary refill takes less than 2 seconds.      Findings: No bruising.   Neurological:      Mental Status: He is alert and oriented to person, place, and time.      GCS: GCS eye subscore is 4. GCS verbal subscore is 5. GCS motor subscore is 6.      Cranial Nerves: No dysarthria or facial asymmetry.      Sensory: Sensory deficit present.      Motor: Weakness and pronator drift present.      Coordination: Finger-Nose-Finger Test abnormal.      Gait: Gait abnormal.      Comments: Decreased sensation right hemibody as compared to the left  Strength 4/5 right upper extremity  Strength 4/5 right lower extremity    Abnormal finger-to-nose right arm       ECG 12 Lead      Date/Time: 9/17/2023 10:53 AM  Performed by: Michael Norris MD  Authorized by: Michael Norris MD   Interpreted by physician  Comments: Normal sinus rhythm, rate 92, left axis, LAFB, no acute ischemic changes        Lab Results (last 24 hours)       Procedure Component Value Units Date/Time    CBC & Differential [[id number]]  (Abnormal) Collected: 09/17/23 1100    Specimen: Blood from Arm, Left Updated: 09/17/23 1117    Narrative:      The following orders were created for panel order CBC & Differential.  Procedure                               Abnormality         Status                     ---------                               -----------         ------                     CBC Auto Differential[[id number]]        Abnormal            Final result                 Please view results for these tests on the individual orders.    Comprehensive Metabolic Panel [[id number]]  (Abnormal) Collected: 09/17/23 1100    Specimen: Blood from Arm,  Left Updated: 09/17/23 1147     Glucose 161 mg/dL      BUN 15 mg/dL      Creatinine 1.01 mg/dL      Sodium 138 mmol/L      Potassium 4.2 mmol/L      Comment: Slight hemolysis detected by analyzer. Results may be affected.        Chloride 105 mmol/L      CO2 23.0 mmol/L      Calcium 9.2 mg/dL      Total Protein 7.3 g/dL      Albumin 4.1 g/dL      ALT (SGPT) 10 U/L      AST (SGOT) 13 U/L      Comment: Slight hemolysis detected by analyzer. Results may be affected.        Alkaline Phosphatase 83 U/L      Total Bilirubin 0.5 mg/dL      Globulin 3.2 gm/dL      A/G Ratio 1.3 g/dL      BUN/Creatinine Ratio 14.9     Anion Gap 10.0 mmol/L      eGFR 84.6 mL/min/1.73     Narrative:      GFR Normal >60  Chronic Kidney Disease <60  Kidney Failure <15      Protime-INR [[id number]]  (Abnormal) Collected: 09/17/23 1100    Specimen: Blood from Arm, Left Updated: 09/17/23 1133     Protime 14.4 Seconds      INR 1.11    CBC Auto Differential [[id number]]  (Abnormal) Collected: 09/17/23 1100    Specimen: Blood from Arm, Left Updated: 09/17/23 1117     WBC 6.96 10*3/mm3      RBC 5.14 10*6/mm3      Hemoglobin 15.7 g/dL      Hematocrit 47.2 %      MCV 91.8 fL      MCH 30.5 pg      MCHC 33.3 g/dL      RDW 11.9 %      RDW-SD 40.5 fl      MPV 11.1 fL      Platelets 220 10*3/mm3      Neutrophil % 67.5 %      Lymphocyte % 22.1 %      Monocyte % 8.8 %      Eosinophil % 0.9 %      Basophil % 0.4 %      Immature Grans % 0.3 %      Neutrophils, Absolute 4.70 10*3/mm3      Lymphocytes, Absolute 1.54 10*3/mm3      Monocytes, Absolute 0.61 10*3/mm3      Eosinophils, Absolute 0.06 10*3/mm3      Basophils, Absolute 0.03 10*3/mm3      Immature Grans, Absolute 0.02 10*3/mm3      nRBC 0.0 /100 WBC     Magnesium [[id number]]  (Normal) Collected: 09/17/23 1100    Specimen: Blood from Arm, Left Updated: 09/17/23 1217     Magnesium 2.2 mg/dL          CT Head Without Contrast    Result Date: 9/17/2023  Narrative: EXAMINATION:  CT HEAD WO CONTRAST-  9/17/2023 11:05  AM CDT  HISTORY: Right arm and leg numbness and weakness since last night.  TECHNIQUE: Multiple axial images were obtained through the brain without contrast infusion. Multiplanar images were reconstructed.  DLP: 712 mGy-cm. Automated dosage reduction technique was utilized to reduce patient dosage.  COMPARISON: No comparison study.  FINDINGS: There is a small area of cortical low density in the left frontal-parietal convexity region (axial image 23 series 3 and coronal image 9 series 7). There are no hemorrhage, edema or mass effect. There is a 7 mm CSF density lesion in the left basal ganglia region anteriorly. The ventricular system is nondilated. There is mild vascular calcification. The paranasal sinuses and mastoid air cells are clear. No calvarial fracture is seen.       Impression: 1. A small area of cortical low density in the left frontal-parietal convexity may represent a small infarct. 2. No hemorrhage, edema or mass effect. 3. A 7 mm area of CSF density in the left basal ganglia region anteriorly may represent a small chronic lacunar infarct or a dilated perivascular space.   The full report of this exam was immediately signed and available to the emergency room. The patient is currently in the emergency room.  This report was finalized on 09/17/2023 11:36 by Dr. Oral Silveira MD.    XR Chest 1 View    Result Date: 9/17/2023  Narrative: EXAMINATION:  XR CHEST 1 VW-  9/17/2023 10:48 AM CDT  HISTORY: Stroke evaluation.  COMPARISON: No comparison study.  TECHNIQUE: Single view AP image.  FINDINGS:  The lungs are expanded bilaterally and clear. There is stable mild elevation/eventration of the right hemidiaphragm. The pleural spaces are clear. Heart size is normal. No acute bony abnormality is seen.       Impression: No active disease is seen.   This report was finalized on 09/17/2023 11:19 by Dr. Oral Silveira MD.       ED Course  ED Course as of 09/17/23 1313   Sun Sep 17, 2023   1309 63-year-old male  with long history of smoking no other known past medical history presents to the emergency department with right arm and leg numbness and mild weakness, gait abnormality.  Last known well greater than 12 hours prior to arrival.  NIH 4.  He was outside window for tPA.  NIH less than 6, did not suspect LVO not candidate for intervention.  CT head ordered, revealed small area of cortical low density in left frontoparietal region which may represent small stroke.  Will need admission to the hospitalist for inpatient stroke evaluation. [AW]      ED Course User Index  [AW] Michael Norris MD                                           Medical Decision Making  Amount and/or Complexity of Data Reviewed  Labs: ordered.  Radiology: ordered.  ECG/medicine tests: ordered and independent interpretation performed.    Risk  Prescription drug management.        Final diagnoses:   Cerebrovascular accident (CVA), unspecified mechanism       ED Disposition  ED Disposition       ED Disposition   Decision to Admit    Condition   --    Comment   Level of Care: Remote Telemetry [26]   Diagnosis: Stroke [889290]   Admitting Physician: SRAVANTHI HANNA [[id number]]   Attending Physician: SRAVANTHI HANNA [[id number]]   Isolate for COVID?: No [0]   Certification: I Certify That Inpatient Hospital Services Are Medically Necessary For Greater Than 2 Midnights                 No follow-up provider specified.       Medication List      No changes were made to your prescriptions during this visit.            Michael Norris MD  09/17/23 1313      Electronically signed by Michael Norris MD at 09/17/23 1313       Facility-Administered Medications as of 9/18/2023   Medication Dose Route Frequency Provider Last Rate Last Admin    acetaminophen (TYLENOL) tablet 650 mg  650 mg Oral Q4H PRN Sravanthi Hanna MD        Or    acetaminophen (TYLENOL) suppository 650 mg  650 mg Rectal Q4H PRN Sravanthi Hanna MD         aspirin chewable tablet 81 mg  81 mg Oral Daily Lopez Phelps MD   81 mg at 23 0830    Or    aspirin suppository 300 mg  300 mg Rectal Daily Lopez Phelps MD        atorvastatin (LIPITOR) tablet 80 mg  80 mg Oral Nightly Lopez Phelps MD   80 mg at 23 2037    [COMPLETED] gadobenate dimeglumine (MULTIHANCE) injection 20 mL  20 mL Intravenous Once in imaging Lopez Phelps MD   20 mL at 23 1605    [COMPLETED] iopamidol (ISOVUE-370) 76 % injection 100 mL  100 mL Intravenous Once in imaging Lopez Phelps MD   100 mL at 23 1641    labetalol (NORMODYNE,TRANDATE) injection 10 mg  10 mg Intravenous Q10 Min PRN Lopez Phelps MD        sodium chloride 0.9 % flush 10 mL  10 mL Intravenous PRN Lopez Phelps MD        sodium chloride 0.9 % flush 10 mL  10 mL Intravenous Q12H Lopez Phelps MD   10 mL at 23 0830    sodium chloride 0.9 % flush 10 mL  10 mL Intravenous PRN Lopez Phelps MD        sodium chloride 0.9 % infusion 40 mL  40 mL Intravenous PRN Lopez Phelps MD         Orders (last 7 days)        Start     Ordered    23 1040  OT Plan of Care Cert / Re-Cert  Once        Comments: Occupational Therapy Plan of Care  Initial Certification  Certification Period: 2023 - 2023    Patient Name: Des Silveira  : 1960    (I63.9) Cerebrovascular accident (CVA), unspecified mechanism  (primary encounter diagnosis)  (R41.89) Cognitive changes                Assessment  OT Assessment  Criteria for Skilled Therapeutic Interventions Met (OT): no problems identified which require skilled intervention, does not meet criteria for skilled intervention                  Plan    OT Plan  Therapy Frequency (OT): evaluation only  Outcome Evaluation: OT eval complete. Patient is alert and oriented x4. Reports slight numbness/tingling in RUE but it's not as bad as when he came to  [facility]. No MMT strength deficits identified. Pt completed sit <> stand t/f with S. Pt able to IND don/doff socks seated at EOB. Pt amb in hallway and then back to room seated in chair with S. At this time, OT isn't deemed necessary d/t pt being at baseline functionally with ADLs. However, will defer to PT at this time d/t balance deficits. Anticipate further d/c home and home with assist.      Alisha Titus, OTR/L, CNT  2023        By cosigning this order, either electronically or physically, I certify that the therapy services are furnished while this patient is under my care, the services outline above are required by this patient, and will be reviewed every 90 days.        M.D.:__________________________________________ Date: ______________    23 1039    23 1010  SLP Plan of Care Cert / Re-Cert  Once        Comments: Speech Language Pathology Plan of Care  Initial Certification  Certification Period: 2023 - 2023    Patient Name: Des Silveira  : 1960    (I63.9) Cerebrovascular accident (CVA), unspecified mechanism  (primary encounter diagnosis)                Assessment  SLP Assessment  SLP Diagnosis: baseline, mild, cognitive-linguistic disorder  SLP Diagnosis Comments: see note  Plan of Care Reviewed With: patient, caregiver  SLC Criteria for Skilled Therapy Interventions Met: baseline status                    Plan    SLP Plan  Therapy Frequency (SLP SLC): evaluation only        Bella Scott, SLP  2023      By cosigning this order, either electronically or physically, I certify that the therapy services are furnished while this patient is under my care, the services outline above are required by this patient, and will be reviewed every 90 days.        M.D.:__________________________________________ Date: ______________    23 1009    23 0900  aspirin chewable tablet 81 mg  Daily        See Hyperspace for full Linked Orders Report.    23 3960     09/18/23 0900  aspirin suppository 300 mg  Daily        [facility]    09/17/23 1409    09/18/23 0600  Hemoglobin A1c  Morning Draw         09/17/23 1409    09/18/23 0600  Lipid Panel  Morning Draw         09/17/23 1409    09/18/23 0600  CBC (No Diff)  Morning Draw         09/17/23 1409    09/18/23 0600  Comprehensive Metabolic Panel  Morning Draw         09/17/23 1409    09/18/23 0010  POC Glucose Once  PROCEDURE ONCE        Comments: Complete no more than 45 minutes prior to patient eating      09/17/23 2358    09/17/23 2100  sodium chloride 0.9 % flush 10 mL  Every 12 Hours Scheduled         09/17/23 1409    09/17/23 2100  atorvastatin (LIPITOR) tablet 80 mg  Nightly         09/17/23 1409    09/17/23 1857  POC Glucose Once  PROCEDURE ONCE        Comments: Complete no more than 45 minutes prior to patient eating      09/17/23 1844    09/17/23 1801  Fall Precautions  Continuous         09/17/23 1800    09/17/23 1800  POC Glucose Q6H  Every 6 Hours,   Status:  Canceled      Comments: May Discontinue After 2 Consecutive Readings Less Than 140Notify Provider if 2 Readings Greater Than 140      09/17/23 1409    09/17/23 1730  iopamidol (ISOVUE-370) 76 % injection 100 mL  Once in Imaging         09/17/23 1641    09/17/23 1700  gadobenate dimeglumine (MULTIHANCE) injection 20 mL  Once in Imaging         09/17/23 1605    09/17/23 1600  Intake and Output  Every 4 Hours       09/17/23 1409    09/17/23 1534  CT Angiogram Head  1 Time Imaging         09/17/23 1534    09/17/23 1521  Diet: Cardiac Diets; Healthy Heart (2-3 Na+); Texture: Regular Texture (IDDSI 7); Fluid Consistency: Thin (IDDSI 0)  Diet Effective Now         09/17/23 1520    09/17/23 1410  NIHSS Assessment  Every 12 Hours        Comments: Turn off all sedation medications prior to performing assessment. Assessment to be performed upon admission, transfer to another unit, discharge, and with neurological decline. If NIHSS change  is greater than or equal to 4 and/or neurological decline is noted notify physician.    09/17/23 1409    09/17/23 1410  MRI Brain With & Without Contrast  1 Time Imaging         09/17/23 1409    09/17/23 1410  CT Angiogram Head w AI Analysis of LVO  1 Time Imaging,   Status:  Canceled         09/17/23 1409    09/17/23 1410  CT Angiogram Neck  1 Time Imaging         09/17/23 1409    09/17/23 1410  Adult Transthoracic Echo Complete W/ Cont if Necessary Per Protocol (With Agitated Saline)  Once         09/17/23 1409    09/17/23 1408  acetaminophen (TYLENOL) tablet 650 mg  Every 4 Hours PRN        See Hyperspace for full Linked Orders Report.    09/17/23 1409    09/17/23 1408  acetaminophen (TYLENOL) suppository 650 mg  Every 4 Hours PRN        See Hyperspace for full Linked Orders Report.    09/17/23 1409    09/17/23 1408  labetalol (NORMODYNE,TRANDATE) injection 10 mg  Every 10 Minutes PRN         09/17/23 1409    09/17/23 1408  Vital Signs  Per Order Details        Comments: For ICU Admission: Vital Signs Every 2 Hours  For Telemetry Unit Admission: Vital Signs Every 4 Hours    09/17/23 1409    09/17/23 1408  Pulse Oximetry, Continuous  Continuous         09/17/23 1409    09/17/23 1408  Continuous Cardiac Monitoring  Continuous        Comments: Follow Standing Orders As Outlined in Process Instructions (Open Order Report to View Full Instructions)    09/17/23 1409    09/17/23 1408  Telemetry - Maintain IV Access  Continuous,   Status:  Canceled         09/17/23 1409    09/17/23 1408  Telemetry - Place Orders & Notify Provider of Results When Patient Experiences Acute Chest Pain, Dysrhythmia or Respiratory Distress  Until Discontinued         09/17/23 1409    09/17/23 1408  Notify Provider  Until Discontinued         09/17/23 1409    09/17/23 1408  Nursing Dysphagia Screening (Complete Prior to Giving Anything By Mouth)  Once         09/17/23 1409    09/17/23 1408  RN to Place Order SLP Consult - Eval & Treat Choosing  Reason of RN Dysphagia Screen Failed  Per Order Details        Comments: RN to Place Order SLP Consult - Eval & Treat Choosing Reason of RN Dysphagia Screen Failed    09/17/23 1409    09/17/23 1408  Nurse to Call MD or Nutrition Services for Diet if Patient Passes Dysphagia Screen  Once         09/17/23 1409    09/17/23 1408  Neuro Checks  Per Order Details        Comments: For ICU Admission: Neuro Checks to Include All Stroke Deficits Every Hour x10 Hours, Then Every 2 Hours,  For Telemetry Unit Admission: Neuro Checks to Include All Stroke Deficits Every 4 Hours    09/17/23 1409    09/17/23 1408  Provide Stroke Education Material  Prior to Discharge        Comments: Educate patient PRN and daily during hospitalization.    09/17/23 1409    09/17/23 1408  OT Consult: Eval & Treat  Once         09/17/23 1409    09/17/23 1408  PT Consult: Eval & Treat As Tolerated  Once         09/17/23 1409    09/17/23 1408  SLP Consult: Eval & Treat Communication Disorder  Once        Comments: Per stroke protocol.    09/17/23 1409    09/17/23 1408  Inpatient Case Management  Consult  Once        Provider:  (Not yet assigned)    09/17/23 1409    09/17/23 1408  Inpatient Diabetes Educator Consult  Once        Provider:  (Not yet assigned)    09/17/23 1409    09/17/23 1408  Assessed for Rehabilitation Services  Once         09/17/23 1409    09/17/23 1408  Reason for Not Administering IV Thrombolytic  Once         09/17/23 1409    09/17/23 1408  Insert Peripheral IV  Once         09/17/23 1409    09/17/23 1408  Saline Lock & Maintain IV Access  Continuous         09/17/23 1409    09/17/23 1408  Place Sequential Compression Device  Once         09/17/23 1409    09/17/23 1408  Maintain Sequential Compression Device  Continuous         09/17/23 1409    09/17/23 1408  Code Status and Medical Interventions:  Continuous         09/17/23 1409    09/17/23 1408  Activity As Tolerated  Until Discontinued         09/17/23 1409     09/17/23 1407  sodium chloride 0.9 % flush 10 mL  As Needed         09/17/23 1409    09/17/23 1407  sodium chloride 0.9 % infusion 40 mL  As Needed         09/17/23 1409    09/17/23 1334  Inpatient Neurology Consult Stroke  Once        Specialty:  Neurology  Provider:  Kelvin David MD    09/17/23 1333    09/17/23 1313  Inpatient Admission  Once         09/17/23 1312    09/17/23 1206  Magnesium  STAT         09/17/23 1205    09/17/23 1129  ECG 12 Lead  Once        Comments: This order was created via procedure documentation    09/17/23 1128    09/17/23 1041  NPO Diet NPO Type: Strict NPO  Diet Effective Now,   Status:  Canceled         09/17/23 1040    09/17/23 1040  Insert Peripheral IV  Once        See Hyperspace for full Linked Orders Report.    09/17/23 1040    09/17/23 1040  CBC & Differential  Once         09/17/23 1040    09/17/23 1040  Comprehensive Metabolic Panel  Once         09/17/23 1040    09/17/23 1040  Protime-INR  Once         09/17/23 1040    09/17/23 1040  Monitor Blood Pressure  Per Hospital Policy         09/17/23 1040    09/17/23 1040  Cardiac Monitoring  Continuous,   Status:  Canceled        Comments: Follow Standing Orders As Outlined in Process Instructions (Open Order Report to View Full Instructions)    09/17/23 1040    09/17/23 1040  Pulse Oximetry, Continuous  Continuous,   Status:  Canceled         09/17/23 1040    09/17/23 1040  ECG 12 Lead Stroke Evaluation  Once         09/17/23 1040    09/17/23 1040  XR Chest 1 View  1 Time Imaging         09/17/23 1040    09/17/23 1040  CT Head Without Contrast  1 Time Imaging         09/17/23 1040    09/17/23 1040  CBC Auto Differential  PROCEDURE ONCE         09/17/23 1040    09/17/23 1039  sodium chloride 0.9 % flush 10 mL  As Needed        See Hyperspace for full Linked Orders Report.    09/17/23 1040    Unscheduled  Order CT Head Without Contrast for Neurological Decline  As Needed       09/17/23 1409    --  SCANNED - TELEMETRY            23 0000                  Physician Progress Notes (last 48 hours)  Notes from 23 1052 through 23 1052   No notes of this type exist for this encounter.          Consult Notes (last 48 hours)        Charisse Bella APRN at 23 1256        Consult Orders    1. Inpatient Neurology Consult Stroke [[id number]] ordered by Lopez Phelps MD at 23 1333                   Neurology Consult Note    Consult Date: 2023  Referring MD: No ref. provider found  Reason for Consult: Stroke     Patient: Des Silveira (63 y.o. male)  MRN: [id number]  : 1960    History of Present Illness:   Des Silveira is a 63 y.o. male    63-year-old male presenting to our emergency room for complaints of right arm and leg numbness with weakness.  Last known well 2100 2023 and onset 0100 2023.  Patient reports having to eat last night.  He went to bed and when he got up he could not control his right side and fell.  Patient presented to our ER at about 1030 this morning.  Patient denies any past medical history.  He does not take prescription medications.  He does not follow with a primary care provider routinely.  He does report most recently he had a episode of middle ear effusion.  This was treated with Flonase, Zyrtec, and a Medrol Dosepak.  He reports continued tinnitus.  Denies headaches.  Work-up in the emergency room revealed CT head with a small area of hypodensity in the left frontal parietal junction.  Patient is seen with his wife in the room.  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  He denies any loud noises or occupational exposures.  Patient does admit to smoking 1 cigar a day.    He does complain to me about continued right-sided numbness.  He has no further complaints.  He denies any dysarthria or dysphagia.  No visual abnormalities.  Thrombolytic therapy was not administered due to last known well greater than 4 and half hours.  Code stroke not  activated due to NIH less than 6.  Interventional radiology was not considered due to signs not consistent with a large vessel occlusion.    Medical History:   Past Medical/Surgical Hx:  History reviewed. No pertinent past medical history.  History reviewed. No pertinent surgical history.    Medications On Admission:  (Not in a hospital admission)      Current Medications:    Current Facility-Administered Medications:     [COMPLETED] Insert Peripheral IV, , , Once **AND** sodium chloride 0.9 % flush 10 mL, 10 mL, Intravenous, PRN, Michael Norris MD    Current Outpatient Medications:     fluticasone (FLONASE) 50 MCG/ACT nasal spray, 2 sprays into the nostril(s) as directed by provider Daily., Disp: 18.2 mL, Rfl: 2    cetirizine (zyrTEC) 10 MG tablet, Take 1 tablet by mouth Daily., Disp: 30 tablet, Rfl: 2    methylPREDNISolone (MEDROL) 4 MG dose pack, Take as directed on package instructions., Disp: 1 each, Rfl: 0     Allergies:  No Known Allergies    Social Hx:  Social History     Socioeconomic History    Marital status:    Tobacco Use    Smoking status: Every Day    Smokeless tobacco: Never   Vaping Use    Vaping Use: Never used       Family Hx:  Family History   Problem Relation Age of Onset    No Known Problems Father     No Known Problems Mother      Physical Examination:   Vital Signs:  Vitals:    09/17/23 1231 09/17/23 1246 09/17/23 1301 09/17/23 1316   BP: 146/89 152/84 173/97 172/100   Pulse: 85 81 85 80   Resp:       Temp:       SpO2: 96% 96% 97% 95%   Weight:       Height:           General Exam:  Head:  Normocephalic, atraumatic  HEENT:  Neck supple  CVS:  Regular rate and rhythm.  No murmurs  Carotid Examination:  No bruits  Lungs:  Clear to auscultation  Abdomen:  Nontender, Nondistended  Extremities:  No signs of peripheral edema  Skin:  No rashes    Neurologic Exam:    Mental Status:    -Awake, Alert, Oriented X 3  -No word finding difficulties  -No aphasia  -No dysarthria  -Follows  simple and complex commands    CN II:  Visual fields full.  Pupils equally reactive to light  CN III, IV, VI:  Extraocular Muscles full with no signs of nystagmus  CN V:  Facial sensory is symmetric with no asymmetries.  CN VII:  Facial motor symmetric  CN VIII:  Gross hearing intact bilaterally  CN IX:  Palate elevates symmetrically  CN X:  Palate elevates symmetrically  CN XI:  Shoulder shrug symmetric  CN XII:  Tongue is midline on protrusion    Motor: (strength out of 5:  1= minimal movement, 2 = movement in plane of gravity, 3 = movement against gravity, 4 = movement against some resistance, 5 = full strength)    -Right Upper Ext: Proximal: 5 Distal: 4+  -Left Upper Ext: Proximal: 5 Distal: 5    -Right Lower Ext: Proximal: 5 Distal: 5  -Left Lower Ext: Proximal: 5 Distal: 5    DTR:  -Right   Biceps: 2+ Triceps: 2+ Brachioradialis: 2+   Patella: 2+ Ankle: 2+ Neg Babinski  -Left   Biceps: 2+ Triceps: 2+ Brachioradialis: 2+   Patella: 2+ Ankle: 2+ Neg Babinski    Sensory:  -Light touch abnormality to right face, arm and leg.  Sensation is less than left.    Coordination:  -Finger to nose abnormal.  Ataxia noted to right side  -Heel to shin intact      Gait  Gait not tested during my exam.    Recent Diagnostics:   Laboratory Results:   - Reviewed in EMR  Lab Results   Component Value Date    GLUCOSE 161 (H) 09/17/2023    CALCIUM 9.2 09/17/2023     09/17/2023    K 4.2 09/17/2023    CO2 23.0 09/17/2023     09/17/2023    BUN 15 09/17/2023    CREATININE 1.01 09/17/2023    EGFRIFNONA 78 02/07/2022    BCR 14.9 09/17/2023    ANIONGAP 10.0 09/17/2023     Lab Results   Component Value Date    WBC 6.96 09/17/2023    HGB 15.7 09/17/2023    HCT 47.2 09/17/2023    MCV 91.8 09/17/2023     09/17/2023     Lab Results   Component Value Date    INR 1.11 (H) 09/17/2023     No results found for: CHOLTOT, TRIG, HDL, LDL  No results found for: HGBA1C    Imaging Results:  Imaging Results (Last 24 Hours)       Procedure  Component Value Units Date/Time    CT Head Without Contrast [[id number]] Collected: 09/17/23 1132     Updated: 09/17/23 1139    Narrative:      EXAMINATION:  CT HEAD WO CONTRAST-  9/17/2023 11:05 AM CDT     HISTORY: Right arm and leg numbness and weakness since last night.     TECHNIQUE: Multiple axial images were obtained through the brain without  contrast infusion. Multiplanar images were reconstructed.     DLP: 712 mGy-cm. Automated dosage reduction technique was utilized to  reduce patient dosage.     COMPARISON: No comparison study.     FINDINGS: There is a small area of cortical low density in the left  frontal-parietal convexity region (axial image 23 series 3 and coronal  image 9 series 7). There are no hemorrhage, edema or mass effect. There  is a 7 mm CSF density lesion in the left basal ganglia region  anteriorly. The ventricular system is nondilated. There is mild vascular  calcification. The paranasal sinuses and mastoid air cells are clear. No  calvarial fracture is seen.          Impression:      1. A small area of cortical low density in the left frontal-parietal  convexity may represent a small infarct.  2. No hemorrhage, edema or mass effect.  3. A 7 mm area of CSF density in the left basal ganglia region  anteriorly may represent a small chronic lacunar infarct or a dilated  perivascular space.        The full report of this exam was immediately signed and available to the  emergency room. The patient is currently in the emergency room.     This report was finalized on 09/17/2023 11:36 by Dr. Oral Silveira MD.    XR Chest 1 View [[id number]] Collected: 09/17/23 1118     Updated: 09/17/23 1122    Narrative:      EXAMINATION:  XR CHEST 1 VW-  9/17/2023 10:48 AM CDT     HISTORY: Stroke evaluation.     COMPARISON: No comparison study.     TECHNIQUE: Single view AP image.     FINDINGS:  The lungs are expanded bilaterally and clear. There is stable  mild elevation/eventration of the right hemidiaphragm.  The pleural  spaces are clear. Heart size is normal. No acute bony abnormality is  seen.          Impression:      No active disease is seen.        This report was finalized on 09/17/2023 11:19 by Dr. Oral Silveira MD.           CT head performed 9/17/2023 reviewed and interpreted by me as showing a hypodensity in the left frontal parietal lobe junction.    Other labs:    Other RAD:        Assessment & Plan:   Patient presenting for symptoms concerning for stroke.  He does have some subjective right-sided sensory loss.  Exam revealed distal right arm weakness.   were equal however.  Etiology behind stroke potentially small vessel disease related to uncontrolled hypertension and tobacco use.  Recommend patient being admitted to hospitalist service with neurology consultation.  We will begin stroke work-up and implement secondary stroke preventative measures.    Impression:  Acute ischemic stroke  Right-sided numbness  Distal right arm weakness  Right arm ataxia   Tobacco use      Plan:   Aspirin 81 mg daily  Atorvastatin 80 mg nightly  Fasting lipid panel  Hemoglobin A1c  MRI brain without contrast  Carotid ultrasound  Cardiac echocardiogram  Cardiac telemetry  SCDs for VTE prophylaxis  ST, PT, OT evaluations  N.p.o. until dysphagia screening  Every 4 hours neurochecks  Recommend permissive hypertension    DAIANA Gunderson  09/17/23  13:26 CDT    Medical Decision Making    Number/Complexity of Problems  Moderate  1 undiagnosed new problem with uncertain prognosis -   1 acute illness with systemic symptoms -   High  1 acute or chronic illness that pose a threat to life/body function -   High: acute stroke     MDM Data  Moderate - 1/3 categories  Extensive - 2/3 categories    Category 1: 3 of the following  Review of external notes  Review of results  Ordering of each unique test  Independent historian  Category 2:  Independent interpretation of test (ex: imaging)  Category 3:  Discussion of management  with another provider    Extensive: Interpretation of CT head and discussion of care with ED provider, review of results     Treatment Plan  Moderate - Prescription Drug management  High  Drug therapy requiring intensive monitoring for toxicity  Decision regarding hospitalization or escalation of care  De-escalate care/DNR decisions  High: Recommending hospital admission and further stroke work-up         Electronically signed by Charisse Bella APRN at 09/17/23 1423        Encounter Date    9/17/23    MRI Brain With & Without Contrast [[id number]] (Order [id number])  Order  Status: Final result     Patient Location    Patient Class Location   Inpatient [facility] 3A, 338, 1     [phone]     Study Notes     Mel Grier on 9/17/2023  4:05 PM CDT   Stroke, follow up     Appointment Information    PACS Images     Radiology Images  Study Result    Narrative & Impression   HISTORY: Stroke follow-up     MRI BRAIN: Multiplanar imaging the brain performed pre- and post-IV  contrast.     COMPARISON: CT head 09/17/2023     FINDINGS: There is prominent susceptibility artifact associated with the  small metallic fragment in the scalp of the right forehead.     There is a 9 mm focus of diffusion restriction within the left thalamus  representing acute nonhemorrhagic ischemia of the left thalamus. No  additional diffusion restriction identified. No intracranial blood  products localized. Ventricles are normal in size and configuration. Old  lacunar infarct adjacent the left basal ganglia suspected. There is no  abnormal intracranial enhancement identified. Corpus callosum intact. No  sellar mass.     Small mucous retention cyst suspected of left maxillary sinus with mild  mucosal thickening of the abdomen sinuses. Limited assessment of the  orbits and base of skull is unremarkable.     IMPRESSION:  1. Acute nonhemorrhagic ischemia of the left thalamus. Findings called  to the nurse caring for the patient on the 3A leslie at 4:34 PM  on  09/17/2023.  2. Old lacunar infarct adjacent the left basal ganglia.  3. Susceptibility artifact associated with a retained metallic fragment  within the soft tissues of the right forehead.  4. No abnormal intracranial enhancement.  This report was finalized on 09/17/2023 16:34 by Dr. Kristine Palacios MD.     Imaging    MRI Brain With & Without Contrast (Order: [id number]) - 9/17/2023    Result History    MRI Brain With & Without Contrast (Order #[id number]) on 9/17/2023 - Order Result History Report    Signed    Electronically signed by Kristine Palacios MD on 9/17/23 at 1634 CDT     Reprint Order Requisition    MRI Brain With & Without Contrast (Order #[id number]) on 9/17/23       Provider Comment To Patient     Not Released  Not seen       MRI Brain With & Without Contrast: Patient Communication     Not Released  Not seen     Signed by    Signed Date/Time  Phone Pager   KRISTINE PALACIOS 9/17/2023  4:34 PM [phone]  4:34 [phone]      Exam Information    Status Exam Begun  Exam Ended    Final [99] 9/17/2023 15:20 9/17/2023  4:25 PM [id number]  4:25 PM     Questions    STEREOTACTIC GUIDANCE PROTOCOL? Yes [1]   Will fiducial markers be needed for this procedure? No   Release to patient Routine Release [[id number]]     Imaging Related Medications    Medication    gadobenate dimeglumine (MULTIHANCE) injection 20 mL (Completed)   Route:   Intravenous   Admin Amount:   20 mL   Volume:   20 mL   Last Admin Time:   09/17/23 1605   Number of Expected Doses:   1   Most Recent Administration:   User Action Time Recorded Time Dose Route Site Comment Action Reason   Mle Grier 09/17/23 1605 09/17/23 1605 20 mL Intravenous   Given    Full Administration Report                External Results Report    Open External Results Report    Encounter    View Encounter             Intra Procedure Documentation    Intra Procedure Documentation     Order-Level Documents on 09/17/2023:      Scan on 9/17/2023 1050 by [facility], [facility]: ECG 12-LEAD      Scan on 9/17/2023 by [facility]: TELEMETRY, [facility], 09/17/2023 1357             Encounter-Level Documents on 09/17/2023:    Electronic signature on 9/17/2023 1048 - E-signed                       Reason for Exam  Priority: Routine  Stroke, follow up     Results Routing Tracking    View Results Routing Information       Order Report     Order Details

## 2023-09-18 NOTE — PLAN OF CARE
Goal Outcome Evaluation:  Plan of Care Reviewed With: patient, spouse        Progress: improving  Outcome Evaluation: PT eval completed.  Pt pleasant and oriented X 4.  Pt agreeable to therapy.  Continues w/ c/os numbness/tingling throughout the R side.  Pt w/ 5/5 strength throughout.  Initially increase effort and decrease accuracy w/ FTN and finger opposition on R.  Pt performed tfers w/ SBA to CGA.  Gait w/out a device w/ CGA w/ noted increase VICKIE, decrease step length, HS, foot clearance, decrease arm swing on R.  Attempted backward gait w/ LOB requiring assist to recover.  Pt w/ increase fall risk and will benefit from continued PT services to improve safety, I w/ mobility and to reduce fall risk.  Feel pt would benefit from short stay in inpatient rehab, however pt refusing, therefore discussed OP therapy.  Seemed to be open to this but report they do not know of any clinics near their hometown.  PT to follow for progress and needs.      Anticipated Discharge Disposition (PT): inpatient rehabilitation facility, home with assist, home with outpatient therapy services

## 2023-09-18 NOTE — PLAN OF CARE
Goal Outcome Evaluation:              Outcome Evaluation: pt vss, still has slight numbness and tingling, not as bad as when he came to hospital. call light in reach. will continue to round for patient safetu

## 2023-09-18 NOTE — CASE MANAGEMENT/SOCIAL WORK
Discharge Planning Assessment  [facility]     Patient Name: Des Silveira  MRN: [id number]  Today's Date: 9/18/2023    Admit Date: 9/17/2023        Discharge Needs Assessment       Row Name 09/18/23 8440       Living Environment    People in Home spouse    Name(s) of People in Home Alisha    Current Living Arrangements home    Potentially Unsafe Housing Conditions none    Primary Care Provided by self    Provides Primary Care For no one    Family Caregiver if Needed spouse    Quality of Family Relationships helpful;involved    Able to Return to Prior Arrangements yes       Resource/Environmental Concerns    Resource/Environmental Concerns none    Transportation Concerns none       Food Insecurity    Within the past 12 months, you worried that your food would run out before you got the money to buy more. Never true    Within the past 12 months, the food you bought just didn't last and you didn't have money to get more. Never true       Transition Planning    Patient/Family Anticipates Transition to home with family    Patient/Family Anticipated Services at Transition none    Transportation Anticipated family or friend will provide       Discharge Needs Assessment    Readmission Within the Last 30 Days no previous admission in last 30 days    Equipment Currently Used at Home none    Concerns to be Addressed denies needs/concerns at this time    Anticipated Changes Related to Illness none    Equipment Needed After Discharge other (see comments)    Outpatient/Agency/Support Group Needs outpatient therapy    Discharge Facility/Level of Care Needs outpatient therapy                   Discharge Plan       Row Name 09/18/23 1905       Plan    Plan Comments Pt lives at home with his spouse and was independent prior to admit. OT has recommended home at time of dc. Anticipate pt will do outpt therapy. Will follow.                  Continued Care and Services - Admitted Since 9/17/2023    Coordination has not been started for  this encounter.          Demographic Summary    No documentation.                  Functional Status    No documentation.                  Psychosocial    No documentation.                  Abuse/Neglect    No documentation.                  Legal    No documentation.                  Substance Abuse    No documentation.                  Patient Forms    No documentation.                     SEAN Henry

## 2023-09-18 NOTE — PLAN OF CARE
Goal Outcome Evaluation:  Plan of Care Reviewed With: patient        Progress: improving  Outcome Evaluation: OT eval complete. Patient is alert and oriented x4. Reports slight numbness/tingling in RUE but it's not as bad as when he came to hospital. No MMT strength deficits identified. Pt completed sit <> stand t/f with S. Pt able to IND don/doff socks seated at EOB. Pt amb in hallway and then back to room seated in chair with S. At this time, OT isn't deemed necessary d/t pt being at baseline functionally with ADLs. However, will defer to PT at this time d/t balance deficits. Anticipate further d/c home and home with assist.      Anticipated Discharge Disposition (OT): home

## 2023-09-18 NOTE — PLAN OF CARE
Goal Outcome Evaluation:  Plan of Care Reviewed With: patient, caregiver        Progress: improving       Speech-Language/Cognitive-Linguistic Evaluation  Subjective: The patient was seen on this date for a Speech-Language Evaluation and Cognitive-Linguistic Evaluation.  Patient was alert and cooperative.    The patient's history is significant for right sided numbness which led to being admitted to rule out CVA, elevated blood pressure, and tobacco use.   Objective: The patient was assessed utilizing informal assessment comprising portions of the [place] Test for Differential Diagnosis of Aphasia, the [place] Naming Test, the Mini Inventory of Right Brain Function, and informal measures due to inability to fully participate in a standardized assessment Findings were as follows:  Assessment: The patient demonstrated mild impaired cognition which the patient reports is his baseline  ability.  Deficits were noted in  short term memory .    Recommendations:  Discharge from  service. .  Other Recommended Evaluations:  n/a     Speech Therapy is not recommended. Rationale at baseline ability  It is anticipated patient will not need continued speech-language pathology services at the next level of care.      Bella Scott, SLP 9/18/2023 10:09 CDT

## 2023-09-18 NOTE — THERAPY EVALUATION
Patient Name: Des Silveira  : 1960    MRN: [id number]                              Today's Date: 2023       Admit Date: 2023    Visit Dx:     ICD-10-CM ICD-9-CM   1. Cerebrovascular accident (CVA), unspecified mechanism  I63.9 434.91   2. Cognitive changes  R41.89 799.59   3. Gait abnormality [R26.9 (ICD-10-CM)]  R26.9 781.2     Patient Active Problem List   Diagnosis    Stroke    Cerebrovascular accident (CVA) of left thalamus    Sequela of lacunar infarction    Elevated blood pressure reading    Tobacco abuse     Past Medical History:   Diagnosis Date    History of transfusion 1964    Stroke 2023     History reviewed. No pertinent surgical history.   General Information       Row Name 23 1443          Physical Therapy Time and Intention    Document Type evaluation;other (see comments)  see MAR  -YULIA     Mode of Treatment physical therapy  -       Row Name 23 1443          General Information    Patient Profile Reviewed yes  -JE     Prior Level of Function independent:;work;yard work;all household mobility;community mobility;ADL's;home management;driving;shopping;using stairs  -JE     Existing Precautions/Restrictions fall  -     Barriers to Rehab impaired sensation  -JE       Row Name 23 1443          Living Environment    People in Home spouse  -     Name(s) of People in Home spouse - Alisha  -       Row Name 23 1443          Home Main Entrance    Number of Stairs, Main Entrance three  -JE     Stair Railings, Main Entrance none  -JE       Row Name 23 1443          Stairs Within Home, Primary    Number of Stairs, Within Home, Primary none  -JE     Stair Railings, Within Home, Primary none  -JE       Row Name 23 1443          Cognition    Orientation Status (Cognition) oriented x 4  -JE       Row Name 23 1443          Safety Issues, Functional Mobility    Safety Issues Affecting Function (Mobility) insight into  deficits/self-awareness;safety precaution awareness  -     Impairments Affecting Function (Mobility) balance;coordination;motor control;sensation/sensory awareness  -               User Key  (r) = Recorded By, (t) = Taken By, (c) = Cosigned By      Initials Name Provider Type    Marina Bright, PT Physical Therapist                   Mobility       [facility] Name 09/18/23 1443          Bed Mobility    Comment, (Bed Mobility) pt sitting up in chair and returned to sit in chair at end of visit  -JE       Row Name 09/18/23 1443          Sit-Stand Transfer    Sit-Stand [place] (Transfers) standby assist;contact guard  -JE       Row Name 09/18/23 1443          Gait/Stairs (Locomotion)    [place] Level (Gait) contact guard;verbal cues  -     Distance in Feet (Gait) Gait ~ 60 ft w/ noted step to type gait pattern - increase VICKIE, decrease arm swing R UE, decrease step length, foot clearance, and HS; noted LOB requiring assist to recover w/ backward gait  -               User Key  (r) = Recorded By, (t) = Taken By, (c) = Cosigned By      Initials Name Provider Type    Marina Bright, PT Physical Therapist                   Obj/Interventions       Row Name 09/18/23 1443          Range of Motion Comprehensive    Comment, General Range of Motion AROM all 4 extremities WFL  -JE       Row Name 09/18/23 1443          Strength Comprehensive (MMT)    Comment, General Manual Muscle Testing (MMT) Assessment grossly 5/5  -JE       Row Name 09/18/23 1443          Motor Skills    Motor Skills other (see comments)  initially noted increase effort and decrease accuracy w/ FTN and finger opposition on R, this did improve w/ repeated efforts  -JE       Row Name 09/18/23 1443          Balance    Balance Assessment sitting static balance;sitting dynamic balance;sit to stand dynamic balance;standing static balance;standing dynamic balance  -     Static Sitting Balance independent  -     Dynamic Sitting Balance  independent  -     Position, Sitting Balance supported;sitting in chair  -     Sit to Stand Dynamic Balance contact guard;standby assist  -     Static Standing Balance contact guard;verbal cues  -     Dynamic Standing Balance contact guard;verbal cues  -     Position/Device Used, Standing Balance supported;unsupported;other (see comments)  HHA obtained w/ backward gait  -       Row Name 09/18/23 1443          Sensory Assessment (Somatosensory)    Sensory Assessment (Somatosensory) other (see comments)  reports a numb/tingling feeling throughout the R side; mild flattening noted of facial tissue on L  -JE               User Key  (r) = Recorded By, (t) = Taken By, (c) = Cosigned By      Initials Name Provider Type    Marina Bright, PT Physical Therapist                   Goals/Plan       Row Name 09/18/23 1443          Bed Mobility Goal 1 (PT)    Activity/Assistive Device (Bed Mobility Goal 1, PT) bed mobility activities, all  -JE     [place] Level/Cues Needed (Bed Mobility Goal 1, PT) independent  -JE     Time Frame (Bed Mobility Goal 1, PT) long term goal (LTG);10 days  -JE     Progress/Outcomes (Bed Mobility Goal 1, PT) goal ongoing  -       Row Name 09/18/23 1443          Transfer Goal 1 (PT)    Activity/Assistive Device (Transfer Goal 1, PT) sit-to-stand/stand-to-sit;bed-to-chair/chair-to-bed  -JE     [place] Level/Cues Needed (Transfer Goal 1, PT) standby assist;independent  -JE     Time Frame (Transfer Goal 1, PT) long term goal (LTG);10 days  -JE     Progress/Outcome (Transfer Goal 1, PT) goal ongoing  -       Row Name 09/18/23 1443          Gait Training Goal 1 (PT)    Activity/Assistive Device (Gait Training Goal 1, PT) gait (walking locomotion);decrease fall risk;diminish gait deviation;improve balance and speed;increase endurance/gait distance  w/ or w/out the most appropriate AD  -JE     [place] Level (Gait Training Goal 1, PT) standby assist  -     Distance (Gait  Training Goal 1, PT) equal step length, increase arm swing on R, foot clearance and heel strike B  tolerating 100+ ft  -JE     Time Frame (Gait Training Goal 1, PT) long term goal (LTG);10 days  -JE     Progress/Outcome (Gait Training Goal 1, PT) goal ongoing  -       Row Name 09/18/23 1443          Stairs Goal 1 (PT)    Activity/Assistive Device (Stairs Goal 1, PT) ascending stairs;descending stairs;step-to-step;decrease fall risk;improve balance and speed;other (see comments)  w/ or w/out the most appropriate AD  -JE     [place] Level/Cues Needed (Stairs Goal 1, PT) contact guard required;standby assist  -     Number of Stairs (Stairs Goal 1, PT) 3  -JE     Time Frame (Stairs Goal 1, PT) long term goal (LTG);10 days  -JE     Progress/Outcome (Stairs Goal 1, PT) goal ongoing  -       Row Name 09/18/23 1443          Patient Education Goal (PT)    Activity (Patient Education Goal, PT) HEP for strengthening and motor control  -     Central/Cues/Accuracy (Memory Goal 2, PT) demonstrates adequately;independent;verbalizes understanding  -JE     Time Frame (Patient Education Goal, PT) long term goal (LTG);10 days  -JE     Progress/Outcome (Patient Education Goal, PT) goal ongoing  -       Row Name 09/18/23 1443          Therapy Assessment/Plan (PT)    Planned Therapy Interventions (PT) balance training;bed mobility training;gait training;home exercise program;patient/family education;motor coordination training;stair training;strengthening;transfer training;other (see comments)  safety/falls prevention  -               User Key  (r) = Recorded By, (t) = Taken By, (c) = Cosigned By      Initials Name Provider Type    Marina Bright, PT Physical Therapist                   Clinical Impression       Row Name 09/18/23 1443          Pain    Pretreatment Pain Rating 0/10 - no pain  -     Posttreatment Pain Rating 0/10 - no pain  -[facility] Name 09/18/23 1443          Plan of Care Review    Plan  of Care Reviewed With patient;spouse  -     Progress improving  -     Outcome Evaluation PT eval completed.  Pt pleasant and oriented X 4.  Pt agreeable to therapy.  Continues w/ c/os numbness/tingling throughout the R side.  Pt w/ 5/5 strength throughout.  Initially increase effort and decrease accuracy w/ FTN and finger opposition on R.  Pt performed tfers w/ SBA to CGA.  Gait w/out a device w/ CGA w/ noted increase VICKIE, decrease step length, HS, foot clearance, decrease arm swing on R.  Attempted backward gait w/ LOB requiring assist to recover.  Pt w/ increase fall risk and will benefit from continued PT services to improve safety, I w/ mobility and to reduce fall risk.  Feel pt would benefit from short stay in inpatient rehab, however pt refusing, therefore discussed OP therapy.  Seemed to be open to this but report they do not know of any clinics near [place] hometown.  PT to follow for progress and needs.  -       Row Name 09/18/23 1443          Therapy Assessment/Plan (PT)    Patient/Family Therapy Goals Statement (PT) return home  -     Rehab Potential (PT) good, to achieve stated therapy goals  -     Criteria for Skilled Interventions Met (PT) yes;meets criteria;skilled treatment is necessary  -     Therapy Frequency (PT) 2 times/day  -     Predicted Duration of Therapy Intervention (PT) until discharge or goals achieved  -       Row Name 09/18/23 1443          Vital Signs    O2 Delivery Pre Treatment room air  -     O2 Delivery Intra Treatment room air  -     O2 Delivery Post Treatment room air  -     Pre Patient Position Sitting  -     Intra Patient Position Standing  -     Post Patient Position Sitting  -       Row Name 09/18/23 1443          Positioning and Restraints    Pre-Treatment Position sitting in chair/recliner  -     Post Treatment Position chair  -     In Chair sitting;call light within reach;encouraged to call for assist;with family/caregiver  -                User Key  (r) = Recorded By, (t) = Taken By, (c) = Cosigned By      Initials Name Provider Type    Marina Bright, PT Physical Therapist                   Outcome Measures       Row Name 09/18/23 1443          How much help from another person do you currently need...    Turning from your back to your side while in flat bed without using bedrails? 4  -JE     Moving from lying on back to sitting on the side of a flat bed without bedrails? 4  -JE     Moving to and from a bed to a chair (including a wheelchair)? 3  -JE     Standing up from a chair using your arms (e.g., wheelchair, bedside chair)? 3  -JE     Climbing 3-5 steps with a railing? 3  -JE     To walk in hospital room? 3  -     AM-PAC 6 Clicks Score (PT) 20  -JE     Highest level of mobility 6 --> Walked 10 steps or more  -       Row Name 09/18/23 1443 09/18/23 0759       Modified Eloisa Scale    Pre-Stroke Modified Eloisa Scale 0 - No Symptoms at all.  -JE 1 - No significant disability despite symptoms.  Able to carry out all usual duties and activities.  -CS (r) MB (t) CS (c)    Modified Eloisa Scale 4 - Moderately severe disability.  Unable to walk without assistance, and unable to attend to own bodily needs without assistance.  -JE 1 - No significant disability despite symptoms.  Able to carry out all usual duties and activities.  -CS (r) MB (t) CS (c)      Row Name 09/18/23 1443 09/18/23 0759       Functional Assessment    Outcome Measure Options AM-PAC 6 Clicks Basic Mobility (PT)  - AM-PAC 6 Clicks Daily Activity (OT);Modified [place]  -CS (r) MB (t) CS (c)              User Key  (r) = Recorded By, (t) = Taken By, (c) = Cosigned By      Initials Name Provider Type    Alisha Yang, OTR/L, CNT Occupational Therapist    Marina Bright, PT Physical Therapist    Clau English, OT Student OT Student                                 Physical Therapy Education       Title: PT OT SLP Therapies (Done)       Topic: Physical Therapy  (Done)       Point: Mobility training (Done)       Learning Progress Summary             Patient Acceptance, E,TB,D, VU,DU,NR by  at 9/18/2023 1626    Comment: education re: purpose of PT/importance of activity, for safety/falls prevention, to increase awareness of deficits and fall risk, for R LE exercise for strengthening and motor control and for R UE providing pt w/ a squeeze ball   Significant Other Acceptance, E,TB,D, VU,DU,NR by  at 9/18/2023 1626    Comment: education re: purpose of PT/importance of activity, for safety/falls prevention, to increase awareness of deficits and fall risk, for R LE exercise for strengthening and motor control and for R UE providing pt w/ a squeeze ball                         Point: Home exercise program (Done)       Learning Progress Summary             Patient Acceptance, E,TB,D, VU,DU,NR by  at 9/18/2023 1626    Comment: education re: purpose of PT/importance of activity, for safety/falls prevention, to increase awareness of deficits and fall risk, for R LE exercise for strengthening and motor control and for R UE providing pt w/ a squeeze ball   Significant Other Acceptance, E,TB,D, VU,DU,NR by  at 9/18/2023 1626    Comment: education re: purpose of PT/importance of activity, for safety/falls prevention, to increase awareness of deficits and fall risk, for R LE exercise for strengthening and motor control and for R UE providing pt w/ a squeeze ball                         Point: Precautions (Done)       Learning Progress Summary             Patient Acceptance, E,TB,D, VU,DU,NR by  at 9/18/2023 1626    Comment: education re: purpose of PT/importance of activity, for safety/falls prevention, to increase awareness of deficits and fall risk, for R LE exercise for strengthening and motor control and for R UE providing pt w/ a squeeze ball   Significant Other Acceptance, E,TB,D, VU,DU,NR by  at 9/18/2023 1626    Comment: education re: purpose of PT/importance of  activity, for safety/falls prevention, to increase awareness of deficits and fall risk, for R LE exercise for strengthening and motor control and for R UE providing pt w/ a squeeze ball                                         User Key       Initials Effective Dates Name Provider Type Discipline     08/02/18 -  Marina Ham, PT Physical Therapist PT                  PT Recommendation and Plan  Planned Therapy Interventions (PT): balance training, bed mobility training, gait training, home exercise program, patient/family education, motor coordination training, stair training, strengthening, transfer training, other (see comments) (safety/falls prevention)  Plan of Care Reviewed With: patient, spouse  Progress: improving  Outcome Evaluation: PT eval completed.  Pt pleasant and oriented X 4.  Pt agreeable to therapy.  Continues w/ c/os numbness/tingling throughout the R side.  Pt w/ 5/5 strength throughout.  Initially increase effort and decrease accuracy w/ FTN and finger opposition on R.  Pt performed tfers w/ SBA to CGA.  Gait w/out a device w/ CGA w/ noted increase VICKIE, decrease step length, HS, foot clearance, decrease arm swing on R.  Attempted backward gait w/ LOB requiring assist to recover.  Pt w/ increase fall risk and will benefit from continued PT services to improve safety, I w/ mobility and to reduce fall risk.  Feel pt would benefit from short stay in inpatient rehab, however pt refusing, therefore discussed OP therapy.  Seemed to be open to this but report they do not know of any clinics near their hometown.  PT to follow for progress and needs.     Time Calculation:         PT Charges       Row Name 09/18/23 1614             Time Calculation    Start Time 1443  -      Stop Time 1522  -      Time Calculation (min) 39 min  -      PT Received On 09/18/23  -      PT Goal Re-Cert Due Date 09/28/23  -                User Key  (r) = Recorded By, (t) = Taken By, (c) = Cosigned By      Initials  Name Provider Type    Marina Bright, PT Physical Therapist                  Therapy Charges for Today       Code Description Service Date Service Provider Modifiers Qty    [id number] HC PT EVAL MOD COMPLEXITY 3 9/18/2023 Marina Ham, PT GP 1            PT G-Codes  Outcome Measure Options: AM-PAC 6 Clicks Basic Mobility (PT)  AM-PAC 6 Clicks Score (PT): 20  AM-PAC 6 Clicks Score (OT): 24  Modified [place] Scale: 4 - Moderately severe disability.  Unable to walk without assistance, and unable to attend to own bodily needs without assistance.  PT Discharge Summary  Anticipated Discharge Disposition (PT): inpatient rehabilitation facility, home with assist, home with outpatient therapy services    Marina Ham, PT  9/18/2023

## 2023-09-18 NOTE — THERAPY DISCHARGE NOTE
Acute Care - Speech Language Pathology Initial Evaluation/Discharge  [facility]     Patient Name: Des Silveira  : 1960  MRN: [id number]  Today's Date: 2023               Admit Date: 2023    Speech-Language/Cognitive-Linguistic Evaluation  Subjective: The patient was seen on this date for a Speech-Language Evaluation and Cognitive-Linguistic Evaluation.  Patient was alert and cooperative.    The patient's history is significant for right sided numbness which led to being admitted to rule out CVA, elevated blood pressure, and tobacco use.   Objective: The patient was assessed utilizing informal assessment comprising portions of the [place] Test for Differential Diagnosis of Aphasia, the [place] Naming Test, the Mini Inventory of Right Brain Function, and informal measures due to inability to fully participate in a standardized assessment Findings were as follows:  Assessment: The patient demonstrated mild impaired cognition which the patient reports is his baseline  ability.  Deficits were noted in short term memory.    Recommendations: Discharge from  service..  Other Recommended Evaluations: n/a    Speech Therapy is not recommended. Rationale at baseline ability  It is anticipated patient will not need continued speech-language pathology services at the next level of care.     Bella Scott, SLP 2023 10:11 CDT       Visit Dx:    ICD-10-CM ICD-9-CM   1. Cerebrovascular accident (CVA), unspecified mechanism  I63.9 434.91   2. Cognitive changes  R41.89 799.59     Patient Active Problem List   Diagnosis    Stroke    Cerebrovascular accident (CVA) of left thalamus    Sequela of lacunar infarction    Elevated blood pressure reading    Tobacco abuse     Past Medical History:   Diagnosis Date    History of transfusion 1964    Stroke 2023     History reviewed. No pertinent surgical history.    SLP Recommendation and Plan  SLP Diagnosis: baseline, mild, cognitive-linguistic disorder  (09/18/23 0928)  SLP Diagnosis Comments: see note (09/18/23 0928)     SLC Criteria for Skilled Therapy Interventions Met: baseline status (09/18/23 0928)  Anticipated Discharge Disposition (SLP): home (09/18/23 0928)                       Reason for Discharge: no further expectation of functional progress (09/18/23 0928)                Plan of Care Reviewed With: patient, caregiver (09/18/23 1004)  Progress: improving (09/18/23 1004)    SLP EVALUATION (last 72 hours)       SLP SLC Evaluation       Row Name 09/18/23 0928                   Communication Assessment/Intervention    Document Type evaluation  -MD        Subjective Information complains of;numbness  -MD        Patient Observations alert;cooperative;agree to therapy  -MD        Patient/Family/Caregiver Comments/Observations no family present  -MD        Patient Effort good  -MD        Symptoms Noted During/After Treatment none  -MD           General Information    Patient Profile Reviewed yes  -MD        Pertinent History Of Current Problem Patient is sadmitted with right sided numbness. ST eval requested due to stroke protocol. Patient medical history includes elevated blood pressure and tobacco use. Patient reports memory trouble at his baseline.  -MD        Precautions/Limitations, Vision WFL;for purposes of eval  -MD        Precautions/Limitations, Hearing WFL;for purposes of eval  -MD        Prior Level of Function-Communication cognitive-linguistic impairment  -MD        Plans/Goals Discussed with patient  -MD        Barriers to Rehab none identified  -MD        Patient's Goals for Discharge return to home  -MD           Pain    Additional Documentation Pain Scale: Numbers Pre/Post-Treatment (Group)  -MD           Pain Scale: Numbers Pre/Post-Treatment    Pretreatment Pain Rating 0/10 - no pain  -MD        Posttreatment Pain Rating 0/10 - no pain  -MD           Oral Musculature and Cranial Nerve Assessment    Oral Motor General Assessment WFL  -MD            Motor Speech Assessment/Intervention    Motor Speech Function WNL  -MD           Cognitive Assessment Intervention- SLP    Cognitive Function (Cognition) mild impairment  -MD        Orientation Status (Cognition) WNL  -MD        Memory (Cognitive) mod-complex;mild impairment;other (see comments)  baseline deficit per patient report  -MD        Attention (Cognitive) alternating;divided;mild impairment;other (see comments)  baseline deficit per patient report  -MD        Thought Organization (Cognitive) WNL  -MD        Reasoning (Cognitive) WNL  -MD        Problem Solving (Cognitive) WNL  -MD           SLP Evaluation Clinical Impressions    SLP Diagnosis baseline;mild;cognitive-linguistic disorder  -MD        SLP Diagnosis Comments see note  -MD        SLC Criteria for Skilled Therapy Interventions Met baseline status  -MD        Functional Impact no impact on function  -MD           Recommendations    Therapy Frequency (SLP SLC) evaluation only  -MD        Anticipated Discharge Disposition (SLP) home  -MD           SLP Discharge Summary    Discharge Destination home  -MD        Discharge Diagnostic Statement no need for skilled ST  -MD        Progress Toward Achieving Short/long Term Goals discharge on same date as initial evaluation  -MD        Reason for Discharge no further expectation of functional progress  -MD                  User Key  (r) = Recorded By, (t) = Taken By, (c) = Cosigned By      Initials Name Effective Dates    Bella Horton, SLP 06/21/22 -                        EDUCATION  The patient has been educated in the following areas:   Cognitive Impairment Communication Impairment.                  Time Calculation:    Time Calculation- SLP       Row Name 09/18/23 1010             Time Calculation- SLP    SLP Start Time 0928  -MD      SLP Stop Time 1010  -MD      SLP Time Calculation (min) 42 min  -MD      SLP Received On 09/18/23  -MD      SLP Goal Re-Cert Due Date 09/28/23  -MD          Untimed Charges    65875-AE Eval Speech and Production w/ Language Minutes 42  -MD         Total Minutes    Untimed Charges Total Minutes 42  -MD       Total Minutes 42  -MD                User Key  (r) = Recorded By, (t) = Taken By, (c) = Cosigned By      Initials Name Provider Type    Bella Horton, SLP Speech and Language Pathologist                    Therapy Charges for Today       Code Description Service Date Service Provider Modifiers Qty    51848463181 HC ST EVAL SPEECH AND PROD W LANG  3 9/18/2023 Bella Scott, SLP GN 1                     SLP Discharge Summary  Anticipated Discharge Disposition (SLP): home  Reason for Discharge: no further expectation of functional progress  Progress Toward Achieving Short/long Term Goals: discharge on same date as initial evaluation  Discharge Destination: home    CAITY Barker  9/18/2023

## 2023-09-18 NOTE — PROGRESS NOTES
[facility] Medicine Services  INPATIENT PROGRESS NOTE    Patient Name: Des Silveira  Date of Admission: 9/17/2023  Today's Date: 09/18/23  Length of Stay: 1  Primary Care Physician: Provider, No Known    Subjective   Chief Complaint: Follow-up  HPI   Patient reports no acute events.  Vital signs are stable.  Patient is yet to have echocardiogram done.  Patient expressed desire to go home today however we did not have an echo completed yet.        Review of Systems   All pertinent negatives and positives are as above. All other systems have been reviewed and are negative unless otherwise stated.     Objective    Temp:  [97.4 °F (36.3 °C)-98.3 °F (36.8 °C)] 97.4 °F (36.3 °C)  Heart Rate:  [63-74] 63  Resp:  [16] 16  BP: (150-162)/(71-92) 150/78  Physical Exam  Vitals and nursing note reviewed.   Constitutional:       General: He is not in acute distress.     Appearance: Normal appearance. He is not diaphoretic.   HENT:      Head: Normocephalic and atraumatic.      Right Ear: External ear normal.      Left Ear: External ear normal.   Eyes:      General: No scleral icterus.     Extraocular Movements: Extraocular movements intact.      Conjunctiva/sclera: Conjunctivae normal.   Cardiovascular:      Rate and Rhythm: Normal rate and regular rhythm.      Heart sounds: Normal heart sounds.   Pulmonary:      Effort: Pulmonary effort is normal. No respiratory distress.      Breath sounds: Normal breath sounds.   Chest:      Chest wall: No tenderness.   Abdominal:      General: Bowel sounds are normal.      Palpations: Abdomen is soft.      Tenderness: There is no abdominal tenderness.   Musculoskeletal:         General: No swelling or tenderness.      Cervical back: Normal range of motion and neck supple.      Right lower leg: No edema.      Left lower leg: No edema.   Skin:     General: Skin is warm and dry.      Capillary Refill: Capillary refill takes less than 2 seconds.       Findings: No erythema or rash.   Neurological:      General: No focal deficit present.      Mental Status: He is alert and oriented to person, place, and time.      Motor: No weakness.   Psychiatric:         Behavior: Behavior normal.           Results Review:  I have reviewed the labs, radiology results, and diagnostic studies.    Laboratory Data:   Results from last 7 days   Lab Units 09/18/23  0518 09/17/23  1100   WBC 10*3/mm3 7.10 6.96   HEMOGLOBIN g/dL 16.4 15.7   HEMATOCRIT % 49.9 47.2   PLATELETS 10*3/mm3 212 220        Results from last 7 days   Lab Units 09/18/23  0518 09/17/23  1100   SODIUM mmol/L 139 138   POTASSIUM mmol/L 4.2 4.2   CHLORIDE mmol/L 105 105   CO2 mmol/L 23.0 23.0   BUN mg/dL 15 15   CREATININE mg/dL 0.95 1.01   CALCIUM mg/dL 9.4 9.2   BILIRUBIN mg/dL 0.9 0.5   ALK PHOS U/L 82 83   ALT (SGPT) U/L 11 10   AST (SGOT) U/L 12 13   GLUCOSE mg/dL 94 161*       Culture Data:   No results found for: BLOODCX, URINECX, WOUNDCX, MRSACX, RESPCX, STOOLCX    Radiology Data:   Imaging Results (Last 24 Hours)       ** No results found for the last 24 hours. **            I have reviewed the patient's current medications.     Assessment/Plan   Assessment  Active Hospital Problems    Diagnosis     **Stroke     Hyperlipidemia     Cerebrovascular accident (CVA) of left thalamus     Sequela of lacunar infarction     Elevated blood pressure reading     Tobacco abuse      63-year-old male on admission for CVA with MRI showing an acute nonhemorrhagic ischemia of the left thalamus.  Patient did not receive tPA due to being outside the window for therapy.  Patient was started on aspirin, and Lipitor.  Echo was ordered and still not yet completed.  Patient has been seen by neurology.    Treatment Plan  Continue aspirin and Lipitor  Echocardiogram not yet completed  Neurology note reviewed.  Plan for outpatient follow-up per neurology in 6 to 8 weeks.  Hemoglobin A1c: 5.7  Hyperlipidemia  Start losartan for blood  pressure control  Continue PT OT    Smoking cessation counseling.    DVT prophylaxis: SCDs    Medical Decision Making  Number and Complexity of problems: 1 acute problem requiring specialist consultation  Differential Diagnosis: As above    Conditions and Status        Condition is improving.     [facility] Data  External documents reviewed: Epic records  Cardiac tracing (EKG, telemetry) interpretation: EKG reviewed  Radiology interpretation: Imaging reviewed  Labs reviewed: Yes  Any tests that were considered but not ordered: None     Decision rules/scores evaluated (example [id number], Wells, etc): N/A     Discussed with: Patient and spouse was in the room     Care Planning  Shared decision making: Patient apprised of current labs, vitals, imaging and treatment plan.  They are agreeable with proceeding with plans as discussed.    Code status and discussions:   Code Status and Medical Interventions:   Ordered at: 09/17/23 1410     Code Status (Patient has no pulse and is not breathing):    CPR (Attempt to Resuscitate)     Medical Interventions (Patient has pulse or is breathing):    Full Support         Disposition  Social Determinants of Health that impact treatment or disposition: Tobacco dependence  I expect the patient to be discharged to home in 1 days.     Electronically signed by Erlinda Aparicio MD, 09/18/23, 16:53 CDT.

## 2023-09-18 NOTE — PROGRESS NOTES
Neurology Progress Note      Chief Complaint:  stroke  Length of Stay:  1   Subjective     Subjective:    No new complaints today.  He continues to have right arm and leg numbness but denies weakness.    Medications:  Current Facility-Administered Medications   Medication Dose Route Frequency Provider Last Rate Last Admin    acetaminophen (TYLENOL) tablet 650 mg  650 mg Oral Q4H PRN Lopez Phelps MD        Or    acetaminophen (TYLENOL) suppository 650 mg  650 mg Rectal Q4H PRN Lopez Phelps MD        aspirin chewable tablet 81 mg  81 mg Oral Daily Lopez Phelps MD   81 mg at 09/18/23 0830    Or    aspirin suppository 300 mg  300 mg Rectal Daily Lopez Phelps MD        atorvastatin (LIPITOR) tablet 80 mg  80 mg Oral Nightly Lopez Phelps MD   80 mg at 09/17/23 2037    labetalol (NORMODYNE,TRANDATE) injection 10 mg  10 mg Intravenous Q10 Min PRN Lopez Phelps MD        sodium chloride 0.9 % flush 10 mL  10 mL Intravenous PRN Lopez Phelps MD        sodium chloride 0.9 % flush 10 mL  10 mL Intravenous Q12H Lopez Phelps MD   10 mL at 09/18/23 0830    sodium chloride 0.9 % flush 10 mL  10 mL Intravenous PRN Lopez Phelps MD        sodium chloride 0.9 % infusion 40 mL  40 mL Intravenous PRN Lopez Phelps MD                 Objective      Vital Signs  Temp:  [97.4 °F (36.3 °C)-98.7 °F (37.1 °C)] 97.4 °F (36.3 °C)  Heart Rate:  [63-97] 63  Resp:  [16-17] 16  BP: (142-173)/() 150/78    Physical Exam:    HEENT:  neck is supple  CVS:  RRR  Lungs:  CTA - B/L  Abd:  NT/ND  Ext:  no edema  Skin:  no rashes    Pertinent Neuro Exam:  Awake and alert and oriented x3  No aphasias no dysarthria is  Moving all extremities; however, the right arm has some distal weakness that I would rated 5 -/5.  Right leg has full strength.  Subjective decrease sensation in the right arm and leg compared to the left  Coordination gait  examination show no signs of gross ataxia nor tremulous activity  Last nurse assessment:  Interval:  (hand off [place])  1a. Level of Consciousness: 0-->Alert, keenly responsive  1b. LOC Questions: 0-->Answers both questions correctly  1c. LOC Commands: 0-->Performs both tasks correctly  2. Best Gaze: 0-->Normal  3. Visual: 0-->No visual loss  4. Facial Palsy: 0-->Normal symmetrical movements  5a. Motor Arm, Left: 0-->No drift, limb holds 90 (or 45) degrees for full 10 secs  5b. Motor Arm, Right: 0-->No drift, limb holds 90 (or 45) degrees for full 10 secs  6a. Motor Leg, Left: 0-->No drift, leg holds 30 degree position for full 5 secs  6b. Motor Leg, Right: 0-->No drift, leg holds 30 degree position for full 5 secs  7. Limb Ataxia: 0-->Absent  8. Sensory: 1-->Mild-to-moderate sensory loss, patient feels pinprick is less sharp or is dull on the affected side, or there is a loss of superficial pain with pinprick, but patient is aware of being touched  9. Best Language: 0-->No aphasia, normal  10. Dysarthria: 0-->Normal  11. Extinction and Inattention (formerly Neglect): 0-->No abnormality    Total (NIH Stroke Scale): 1       Results Review:      Labs:  LDL of 160  Hemoglobin A1c of 5.7%  Metabolic panel unremarkable  CBC unremarkable    Imaging:    MRI brain reviewed by me showing an acute diffusion abnormality left thalamus and a chronic lacunar infarct in the extreme frontal aspects of the left basal ganglia and potentially even the caudate.  CT angiography of head and neck reviewed by me.  No evidence of large vessel occlusions.  Cardiac echo pending    Assessment/Plan     Hospital Problem List      Stroke    Cerebrovascular accident (CVA) of left thalamus    Sequela of lacunar infarction    Elevated blood pressure reading    Tobacco abuse    Impression:  Microvascular disease with a resultant left thalamic stroke  Hypertension with initial blood pressure upon presentation of 191/92 with the most recent blood  pressures being in the 150s systolic  Hyperlipidemia with a current LDL of 160 and a goal of less than 70  Noncompliance with routine medical visits and no current primary care physician  Tobacco user currently utilizing 1.5 cigars/day  Lives at home with wife.  Continues to work.    Plan:  Follow-up cardiac echo  Aspirin 81 mg daily  Lipitor 80 mg daily  Outpatient systolic blood pressure goal less than 140  Tobacco cessation counseling  Would recommend following up with a primary care physician to treat hypertension and attend wellness visits.  He would prefer 1 in [facility] if possible but if not, he is amenable to coming to [place] to one of our primary care clinics  Awaiting therapy recommendations but will likely not need inpatient.  From neurology standpoint can be discharged later today if therapy okays.  Should follow-up with neurology in 6 to 8 weeks.  Follow-up with his primary care physician in the next 7 to 10 days (will need to establish 1)      Medical Decision Making    Number/Complexity of Problems  Moderate  1 undiagnosed new problem with uncertain prognosis -   1 acute illness with systemic symptoms -   High  1 acute or chronic illness that pose a threat to life/body function -   High - stroke     MDM Data  Moderate - 1/3 categories  Extensive - 2/3 categories    Category 1: 3 of the following  Review of external notes  Review of results  Ordering of each unique test  Independent historian  Category 2:  Independent interpretation of test (ex: imaging)  Category 3:  Discussion of management with another provider    Independent interpretation of imaging and discussion with primary team     Treatment Plan  Moderate - Prescription Drug management  High  Drug therapy requiring intensive monitoring for toxicity  Decision regarding hospitalization or escalation of care  De-escalate care/DNR decisions         Jefe Hartmann MD  09/18/23  10:40 CDT

## 2023-09-19 ENCOUNTER — APPOINTMENT (OUTPATIENT)
Dept: CARDIOLOGY | Facility: HOSPITAL | Age: 63
DRG: 066 | End: 2023-09-19
Payer: COMMERCIAL

## 2023-09-19 ENCOUNTER — READMISSION MANAGEMENT (OUTPATIENT)
Dept: CALL CENTER | Facility: HOSPITAL | Age: 63
End: 2023-09-19
Payer: COMMERCIAL

## 2023-09-19 VITALS
RESPIRATION RATE: 16 BRPM | HEIGHT: 71 IN | SYSTOLIC BLOOD PRESSURE: 140 MMHG | DIASTOLIC BLOOD PRESSURE: 75 MMHG | OXYGEN SATURATION: 99 % | BODY MASS INDEX: 30.38 KG/M2 | TEMPERATURE: 97.8 F | HEART RATE: 66 BPM | WEIGHT: 217 LBS

## 2023-09-19 LAB
BH CV ECHO MEAS - AO MAX PG: 10.2 MMHG
BH CV ECHO MEAS - AO MEAN PG: 5 MMHG
BH CV ECHO MEAS - AO ROOT DIAM: 4.4 CM
BH CV ECHO MEAS - AO V2 MAX: 160 CM/SEC
BH CV ECHO MEAS - AO V2 VTI: 35 CM
BH CV ECHO MEAS - AVA(I,D): 3 CM2
BH CV ECHO MEAS - EDV(CUBED): 126.5 ML
BH CV ECHO MEAS - EDV(MOD-SP2): 82.9 ML
BH CV ECHO MEAS - EDV(MOD-SP4): 92.2 ML
BH CV ECHO MEAS - EF(MOD-BP): 64 %
BH CV ECHO MEAS - EF(MOD-SP2): 60.2 %
BH CV ECHO MEAS - EF(MOD-SP4): 68.1 %
BH CV ECHO MEAS - ESV(CUBED): 43.6 ML
BH CV ECHO MEAS - ESV(MOD-SP2): 33 ML
BH CV ECHO MEAS - ESV(MOD-SP4): 29.4 ML
BH CV ECHO MEAS - FS: 29.9 %
BH CV ECHO MEAS - IVS/LVPW: 1.06 CM
BH CV ECHO MEAS - IVSD: 1.28 CM
BH CV ECHO MEAS - LA DIMENSION: 3.6 CM
BH CV ECHO MEAS - LAT PEAK E' VEL: 8.9 CM/SEC
BH CV ECHO MEAS - LV DIASTOLIC VOL/BSA (35-75): 42.2 CM2
BH CV ECHO MEAS - LV MASS(C)D: 247.8 GRAMS
BH CV ECHO MEAS - LV MAX PG: 6.7 MMHG
BH CV ECHO MEAS - LV MEAN PG: 3 MMHG
BH CV ECHO MEAS - LV SYSTOLIC VOL/BSA (12-30): 13.5 CM2
BH CV ECHO MEAS - LV V1 MAX: 129 CM/SEC
BH CV ECHO MEAS - LV V1 VTI: 25.4 CM
BH CV ECHO MEAS - LVIDD: 5 CM
BH CV ECHO MEAS - LVIDS: 3.5 CM
BH CV ECHO MEAS - LVOT AREA: 4.2 CM2
BH CV ECHO MEAS - LVOT DIAM: 2.3 CM
BH CV ECHO MEAS - LVPWD: 1.21 CM
BH CV ECHO MEAS - MED PEAK E' VEL: 6.5 CM/SEC
BH CV ECHO MEAS - MV A MAX VEL: 77.1 CM/SEC
BH CV ECHO MEAS - MV DEC TIME: 0.21 SEC
BH CV ECHO MEAS - MV E MAX VEL: 62.1 CM/SEC
BH CV ECHO MEAS - MV E/A: 0.81
BH CV ECHO MEAS - MV MAX PG: 2.7 MMHG
BH CV ECHO MEAS - MV MEAN PG: 1 MMHG
BH CV ECHO MEAS - MV V2 VTI: 26.6 CM
BH CV ECHO MEAS - MVA(VTI): 4 CM2
BH CV ECHO MEAS - PA V2 MAX: 110 CM/SEC
BH CV ECHO MEAS - RAP SYSTOLE: 5 MMHG
BH CV ECHO MEAS - RV MAX PG: 2.9 MMHG
BH CV ECHO MEAS - RV V1 MAX: 84.9 CM/SEC
BH CV ECHO MEAS - RV V1 VTI: 17.1 CM
BH CV ECHO MEAS - RVDD: 3.6 CM
BH CV ECHO MEAS - RVSP: 20.2 MMHG
BH CV ECHO MEAS - SI(MOD-SP2): 22.9 ML/M2
BH CV ECHO MEAS - SI(MOD-SP4): 28.8 ML/M2
BH CV ECHO MEAS - SV(LVOT): 105.5 ML
BH CV ECHO MEAS - SV(MOD-SP2): 49.9 ML
BH CV ECHO MEAS - SV(MOD-SP4): 62.8 ML
BH CV ECHO MEAS - TAPSE (>1.6): 2.17 CM
BH CV ECHO MEAS - TR MAX PG: 15.2 MMHG
BH CV ECHO MEAS - TR MAX VEL: 195 CM/SEC
BH CV ECHO MEASUREMENTS AVERAGE E/E' RATIO: 8.06
BH CV ECHO SHUNT ASSESSMENT PERFORMED (HIDDEN SCRIPTING): 1
BH CV XLRA - TDI S': 12.7 CM/SEC
LEFT ATRIUM VOLUME INDEX: 21.6 ML/M2
LEFT ATRIUM VOLUME: 52.9 ML

## 2023-09-19 PROCEDURE — 93306 TTE W/DOPPLER COMPLETE: CPT

## 2023-09-19 PROCEDURE — 97116 GAIT TRAINING THERAPY: CPT

## 2023-09-19 PROCEDURE — 93306 TTE W/DOPPLER COMPLETE: CPT | Performed by: INTERNAL MEDICINE

## 2023-09-19 RX ORDER — LOSARTAN POTASSIUM 50 MG/1
50 TABLET ORAL
Qty: 90 TABLET | Refills: 0 | Status: SHIPPED | OUTPATIENT
Start: 2023-09-20 | End: 2023-12-19

## 2023-09-19 RX ORDER — ASPIRIN 81 MG/1
81 TABLET, CHEWABLE ORAL DAILY
Qty: 90 TABLET | Refills: 0 | Status: SHIPPED | OUTPATIENT
Start: 2023-09-20 | End: 2023-12-19

## 2023-09-19 RX ORDER — ATORVASTATIN CALCIUM 80 MG/1
80 TABLET, FILM COATED ORAL NIGHTLY
Qty: 90 TABLET | Refills: 0 | Status: SHIPPED | OUTPATIENT
Start: 2023-09-19

## 2023-09-19 RX ADMIN — ASPIRIN 81 MG: 81 TABLET, CHEWABLE ORAL at 09:02

## 2023-09-19 RX ADMIN — LOSARTAN POTASSIUM 50 MG: 50 TABLET, FILM COATED ORAL at 09:02

## 2023-09-19 NOTE — PAYOR COMM NOTE
"Ref:   [id number]     [facility]  Fax  [phone]     Sukhdev Silveira (63 y.o. Male)       Date of Birth   1960    Social Security Number       Address   [address] [address]    Home Phone   [phone]    MRN   [id number]       [employer]   Other    Marital Status                               Admission Date   9/17/23    Admission Type   Emergency    Admitting Provider   Erlinda Aparicio MD    Attending Provider       Department, Room/Bed   [facility] 3A, 338/1       Discharge Date   9/19/2023    Discharge Disposition   Home or Self Care    Discharge Destination                                 Attending Provider: (none)   Allergies: No Known Allergies    Isolation: None   Infection: None   Code Status: Prior    Ht: 180.3 cm (71\")   Wt: 98.4 kg (217 lb)    Admission Cmt: None   Principal Problem: Stroke [I63.9]                   Active Insurance as of 9/17/2023       Primary Coverage       Payor Plan Insurance Group Employer/Plan Group    [facility] [id number]       Payor Plan Address Payor Plan Phone Number Payor Plan Fax Number Effective Dates    [address] [address] [phone]  3/1/2022 - None Entered    [facility] [address]         Subscriber Name Subscriber Birth Date Member ID       SUKHDEV SILVEIRA 1960 [id number]                     Emergency Contacts        (Rel.) Home Phone Work Phone Mobile Phone    Alisha Silveira (Spouse) [phone] -- --                 Discharge Summary        Erlinda Aparicio MD at 09/19/23 0745                [facility] Services  DISCHARGE SUMMARY       Date of Admission: 9/17/2023  Date of Discharge:  9/19/2023  Primary Care Physician: Provider, No Known    Presenting Problem/History of Present Illness:  Adapted from H&P from 9/17/2023  63 year old male with no significant PMH that presents to the ER with complains of numbness " in right upper and lower extremities of sudden onset last night at about 1 AM. He was not sure what it was and decided to come to the ER this morning as symptoms persisted. He presented 9 hours after the onset of symptoms. No prior episodes, no associated chest pain, palpitations or weakness.      Final Discharge Diagnoses:  Active Hospital Problems    Diagnosis     **Stroke     Hyperlipidemia     Cerebrovascular accident (CVA) of left thalamus     Sequela of lacunar infarction     Elevated blood pressure reading     Tobacco abuse        Consults: neurology    Procedures Performed: none     Pertinent Test Results:       Imaging Results (All)       Procedure Component Value Units Date/Time    CT Angiogram Neck [[id number]] Collected: 09/17/23 1645     Updated: 09/17/23 1651    Narrative:      CT ANGIOGRAM NECK- 9/17/2023 3:49 PM CDT     HISTORY: Stroke, follow up; I63.9-Cerebral infarction, unspecified      COMPARISON: None     DOSE LENGTH PRODUCT: 175 mGy cm. Automated exposure control was also  utilized to decrease patient radiation dose.     TECHNIQUE: Axial images the neck are performed following IV contrast.  2-D and maximal intensity projectional reconstructed images are reviewed     FINDINGS:  Normal caliber thoracic aortic arch. Appropriate origin of  the great vessels from the arch. Right brachiocephalic artery is patent.  No visible subclavian artery stenosis. Decreased visualization of the  left subclavian artery from streak artifact created from the left upper  extremity venous bolus.     Mild tortuosity of the maximal common carotid arteries which appear  widely patent. Minimal plaque of the carotid bulbs with no significant  bilateral internal or external carotid artery stenosis. Prominent  tortuosity of the patent distal left internal carotid artery.     The vertebral arteries originate from the proximal subclavian arteries  as expected. Hypoplastic left vertebral artery. No focal high-grade  vertebral  artery stenosis.     No aneurysm or dissection.     Peripheral emphysema noted of the upper lobes. No pathologic  lymphadenopathy or soft tissue mass identified within the neck.     Moderate diffuse degenerative change of the cervical spine with reversal  normal cervical lordosis.       Impression:      1. Widely patent bilateral extracranial carotid arteries with tortuosity  as described above.  2. Patent vertebral arteries. Hypoplastic left vertebral artery.  3. No regional aneurysm or dissection.  4. Mild emphysema of the visible upper lobes.  This report was finalized on 09/17/2023 16:48 by Dr. Kristine Palacios MD.    CT Angiogram Head [[id number]] Collected: 09/17/23 1642     Updated: 09/17/23 1648    Narrative:      CT ANGIOGRAM HEAD- 9/17/2023 3:49 PM CDT     HISTORY: Stroke, follow up; I63.9-Cerebral infarction, unspecified      COMPARISON: None     DOSE LENGTH PRODUCT: 175 mGy cm. Automated exposure control was also  utilized to decrease patient radiation dose.     TECHNIQUE: Axial images the brain are performed following IV contrast.  2-D and maximal intensity projectional reconstructed images are reviewed     FINDINGS:  The distal bilateral internal carotid arteries are patent  with mild calcification of the cavernous segments. Mild hypoplasia of  the bilateral A1 anterior cerebral arteries. Bilateral middle cerebral  arteries are patent. Hypoplastic left distal vertebral artery. Patent  right normal caliber vertebral artery. Basilar artery is patent.  Bilateral posterior cerebral arteries are patent. The superior and  inferior cerebellar arteries appear normal in caliber. The [place] of  [place] appears intact. No large vessel occlusion. No aneurysm or  dissection. No abnormal intracranial enhancement identified.       Impression:      1. No large vessel occlusion. No aneurysm or dissection. [place] of  [place] appears intact.  This report was finalized on 09/17/2023 16:44 by Dr. Kristine Palacios MD.    MRI  Brain With & Without Contrast [[id number]] Collected: 09/17/23 1629     Updated: 09/17/23 1637    Narrative:      HISTORY: Stroke follow-up     MRI BRAIN: Multiplanar imaging the brain performed pre- and post-IV  contrast.     COMPARISON: CT head 09/17/2023     FINDINGS: There is prominent susceptibility artifact associated with the  small metallic fragment in the scalp of the right forehead.     There is a 9 mm focus of diffusion restriction within the left thalamus  representing acute nonhemorrhagic ischemia of the left thalamus. No  additional diffusion restriction identified. No intracranial blood  products localized. Ventricles are normal in size and configuration. Old  lacunar infarct adjacent the left basal ganglia suspected. There is no  abnormal intracranial enhancement identified. Corpus callosum intact. No  sellar mass.     Small mucous retention cyst suspected of left maxillary sinus with mild  mucosal thickening of the abdomen sinuses. Limited assessment of the  orbits and base of skull is unremarkable.       Impression:      1. Acute nonhemorrhagic ischemia of the left thalamus. Findings called  to the nurse caring for the patient on the 3A leslie at 4:34 PM on  09/17/2023.  2. Old lacunar infarct adjacent the left basal ganglia.  3. Susceptibility artifact associated with a retained metallic fragment  within the soft tissues of the right forehead.  4. No abnormal intracranial enhancement.  This report was finalized on 09/17/2023 16:34 by Dr. Kristine Palacios MD.    CT Head Without Contrast [[id number]] Collected: 09/17/23 1132     Updated: 09/17/23 1139    Narrative:      EXAMINATION:  CT HEAD WO CONTRAST-  9/17/2023 11:05 AM CDT     HISTORY: Right arm and leg numbness and weakness since last night.     TECHNIQUE: Multiple axial images were obtained through the brain without  contrast infusion. Multiplanar images were reconstructed.     DLP: 712 mGy-cm. Automated dosage reduction technique was utilized  to  reduce patient dosage.     COMPARISON: No comparison study.     FINDINGS: There is a small area of cortical low density in the left  frontal-parietal convexity region (axial image 23 series 3 and coronal  image 9 series 7). There are no hemorrhage, edema or mass effect. There  is a 7 mm CSF density lesion in the left basal ganglia region  anteriorly. The ventricular system is nondilated. There is mild vascular  calcification. The paranasal sinuses and mastoid air cells are clear. No  calvarial fracture is seen.          Impression:      1. A small area of cortical low density in the left frontal-parietal  convexity may represent a small infarct.  2. No hemorrhage, edema or mass effect.  3. A 7 mm area of CSF density in the left basal ganglia region  anteriorly may represent a small chronic lacunar infarct or a dilated  perivascular space.        The full report of this exam was immediately signed and available to the  emergency room. The patient is currently in the emergency room.     This report was finalized on 09/17/2023 11:36 by Dr. Oral Silveira MD.    XR Chest 1 View [[id number]] Collected: 09/17/23 1118     Updated: 09/17/23 1122    Narrative:      EXAMINATION:  XR CHEST 1 VW-  9/17/2023 10:48 AM CDT     HISTORY: Stroke evaluation.     COMPARISON: No comparison study.     TECHNIQUE: Single view AP image.     FINDINGS:  The lungs are expanded bilaterally and clear. There is stable  mild elevation/eventration of the right hemidiaphragm. The pleural  spaces are clear. Heart size is normal. No acute bony abnormality is  seen.          Impression:      No active disease is seen.        This report was finalized on 09/17/2023 11:19 by Dr. Oral Silveira MD.          LAB RESULTS:      Lab 09/18/23  0518 09/17/23  1100   WBC 7.10 6.96   HEMOGLOBIN 16.4 15.7   HEMATOCRIT 49.9 47.2   PLATELETS 212 220   NEUTROS ABS  --  4.70   IMMATURE GRANS (ABS)  --  0.02   LYMPHS ABS  --  1.54   MONOS ABS  --  0.61   EOS ABS  --  " 0.06   MCV 93.1 91.8   PROTIME  --  14.4         Lab 09/18/23  0518 09/17/23  1100   SODIUM 139 138   POTASSIUM 4.2 4.2   CHLORIDE 105 105   CO2 23.0 23.0   ANION GAP 11.0 10.0   BUN 15 15   CREATININE 0.95 1.01   EGFR 91.1 84.6   GLUCOSE 94 161*   CALCIUM 9.4 9.2   MAGNESIUM  --  2.2   HEMOGLOBIN A1C 5.70*  --          Lab 09/18/23  0518 09/17/23  1100   TOTAL PROTEIN 7.6 7.3   ALBUMIN 4.3 4.1   GLOBULIN 3.3 3.2   ALT (SGPT) 11 10   AST (SGOT) 12 13   BILIRUBIN 0.9 0.5   ALK PHOS 82 83         Lab 09/17/23  1100   PROTIME 14.4   INR 1.11*         Lab 09/18/23  0518   CHOLESTEROL 215*   LDL CHOL 160*   HDL CHOL 29*   TRIGLYCERIDES 139             Brief Urine Lab Results       None          Microbiology Results (last 10 days)       ** No results found for the last 240 hours. **            Hospital Course: 63-year-old male on admission for CVA with MRI showing an acute nonhemorrhagic ischemia of the left thalamus. Patient did not receive tPA due to being outside the window for therapy. Patient seen by neurology and was started on aspirin, and Lipitor with recommendation for better blood pressure control. Patient did not have a PCP. Patient started on Losartan and discussed about establishing with a PCP. Patient will follow up with neurology outpatient in 6-8 weeks.  Patient was counseled on smoking cessation.  Patient will continue with PT OT at discharge.      Physical Exam on Discharge:  /75   Pulse 66   Temp 97.8 °F (36.6 °C) (Oral)   Resp 16   Ht 180.3 cm (71\")   Wt 98.4 kg (217 lb)   SpO2 99%   BMI 30.27 kg/m²   Physical Exam  Vitals and nursing note reviewed.   Constitutional:       General: He is not in acute distress.     Appearance: Normal appearance. He is not diaphoretic.   HENT:      Head: Normocephalic and atraumatic.      Right Ear: External ear normal.      Left Ear: External ear normal.   Eyes:      General: No scleral icterus.     Extraocular Movements: Extraocular movements intact.      " Conjunctiva/sclera: Conjunctivae normal.   Cardiovascular:      Rate and Rhythm: Normal rate and regular rhythm.      Heart sounds: Normal heart sounds.   Pulmonary:      Effort: Pulmonary effort is normal. No respiratory distress.      Breath sounds: Normal breath sounds.   Chest:      Chest wall: No tenderness.   Abdominal:      General: Bowel sounds are normal.      Palpations: Abdomen is soft.      Tenderness: There is no abdominal tenderness.   Musculoskeletal:         General: No swelling or tenderness.      Cervical back: Normal range of motion and neck supple.      Right lower leg: No edema.      Left lower leg: No edema.   Skin:     General: Skin is warm and dry.      Capillary Refill: Capillary refill takes less than 2 seconds.      Findings: No erythema or rash.   Neurological:      General: No focal deficit present.      Mental Status: He is alert and oriented to person, place, and time.      Motor: No weakness.   Psychiatric:         Behavior: Behavior normal.         Condition on Discharge: stable/improving     Discharge Disposition:  Home or Self Care    Discharge Medications:     Discharge Medications        New Medications        Instructions Start Date   aspirin 81 MG chewable tablet   81 mg, Oral, Daily   Start Date: September 20, 2023     atorvastatin 80 MG tablet  Commonly known as: LIPITOR   80 mg, Oral, Nightly      losartan 50 MG tablet  Commonly known as: COZAAR   50 mg, Oral, Every 24 Hours Scheduled   Start Date: September 20, 2023                Discharge Diet:   Diet Instructions       Diet: Cardiac Diets; Healthy Heart (2-3 Na+); Regular Texture (IDDSI 7); Thin (IDDSI 0)      Discharge Diet: Cardiac Diets    Cardiac Diet: Healthy Heart (2-3 Na+)    Texture: Regular Texture (IDDSI 7)    Fluid Consistency: Thin (IDDSI 0)            Activity at Discharge:   Activity Instructions       Activity as Tolerated              Follow-up Appointments:   Discharge Follow-up with Specialty:  Neurology; 6 Weeks  What's Next  What's Next         Follow up with No Known Provider [facility] [id number]          Ambulatory Referral to Family Practice  Family Medicine  Establish with a PCP close to home           Ambulatory Referral to Physical Therapy Neuro, Evaluate and treat  Physical Therapy           Referral to Occupational Therapy  Occupational Therapy    Discharge Follow-up with Specialty: Neurology; 6 Weeks      Test Results Pending at Discharge: echo    Electronically signed by Jamil Aquino MD, 09/19/23, 09:56 CDT.    Time: 32 minutes.          Electronically signed by Jamil Aquino MD at 09/19/23 0957       Discharge Order (From admission, onward)       Start     Ordered    09/19/23 0938  Discharge patient  Once        Expected Discharge Date: 09/19/23   Discharge Disposition: Home or Self Care   Physician of Record for Attribution - Please select from Treatment Team: JAMIL AQUINO [[id number]]   Review needed by CMO to determine Physician of Record: No      Question Answer Comment   Physician of Record for Attribution - Please select from Treatment Team JAMIL AQUINO    Review needed by CMO to determine Physician of Record No        09/19/23 0952

## 2023-09-19 NOTE — PLAN OF CARE
Goal Outcome Evaluation:   Pt alert and oriented x4. NIHSS remain 1. VSS. no c/o pain. MERLOS. PPP. SCDS  for VTE. . Tolerating cardiac diet. Skin intact. Voiding . Last BM 9/19/23.  D/c plans. Call light within reach. Safety maintained.

## 2023-09-19 NOTE — THERAPY DISCHARGE NOTE
Acute Care - Physical Therapy Discharge Summary  [facility]       Patient Name: Des Silveira  : 1960  MRN: [id number]    Today's Date: 2023                 Admit Date: 2023      PT Recommendation and Plan    Visit Dx:    ICD-10-CM ICD-9-CM   1. Cerebrovascular accident (CVA), unspecified mechanism  I63.9 434.91   2. Cognitive changes  R41.89 799.59   3. Gait abnormality [R26.9 (ICD-10-CM)]  R26.9 781.2   4. Elevated blood pressure reading  R03.0 796.2   5. Tobacco abuse  Z72.0 305.1   6. Hyperlipidemia, unspecified hyperlipidemia type  E78.5 272.4            PT Charges       Row Name 23 0901             Time Calculation    Start Time 0844  -NW      Stop Time 0901  -NW      Time Calculation (min) 17 min  -NW      PT Received On 23  -NW      PT Goal Re-Cert Due Date 23  -NW         Time Calculation- PT    Total Timed Code Minutes- PT 17 minute(s)  -NW         Timed Charges    61257 - Gait Training Minutes  17  -NW         Total Minutes    Timed Charges Total Minutes 17  -NW       Total Minutes 17  -NW                User Key  (r) = Recorded By, (t) = Taken By, (c) = Cosigned By      Initials Name Provider Type    Netta Bhandari, PTA Physical Therapist Assistant                     PT Rehab Goals       Row Name 23 1532             Bed Mobility Goal 1 (PT)    Activity/Assistive Device (Bed Mobility Goal 1, PT) bed mobility activities, all  -NW      [place] Level/Cues Needed (Bed Mobility Goal 1, PT) independent  -NW      Time Frame (Bed Mobility Goal 1, PT) long term goal (LTG);10 days  -NW      Progress/Outcomes (Bed Mobility Goal 1, PT) goal not met  -NW         Transfer Goal 1 (PT)    Activity/Assistive Device (Transfer Goal 1, PT) sit-to-stand/stand-to-sit;bed-to-chair/chair-to-bed  -NW      [place] Level/Cues Needed (Transfer Goal 1, PT) standby assist;independent  -NW      Time Frame (Transfer Goal 1, PT) long term goal (LTG);10 days  -NW       Progress/Outcome (Transfer Goal 1, PT) goal not met  -NW         Gait Training Goal 1 (PT)    Activity/Assistive Device (Gait Training Goal 1, PT) gait (walking locomotion);decrease fall risk;diminish gait deviation;improve balance and speed;increase endurance/gait distance  w/ or w/out the most appropriate AD  -NW      [place] Level (Gait Training Goal 1, PT) standby assist  -NW      Distance (Gait Training Goal 1, PT) equal step length, increase arm swing on R, foot clearance and heel strike B  tolerating 100+ ft  -NW      Time Frame (Gait Training Goal 1, PT) long term goal (LTG);10 days  -NW      Progress/Outcome (Gait Training Goal 1, PT) goal not met  -NW         Stairs Goal 1 (PT)    Activity/Assistive Device (Stairs Goal 1, PT) ascending stairs;descending stairs;step-to-step;decrease fall risk;improve balance and speed;other (see comments)  w/ or w/out the most appropriate AD  -NW      [place] Level/Cues Needed (Stairs Goal 1, PT) contact guard required;standby assist  -NW      Number of Stairs (Stairs Goal 1, PT) 3  -NW      Time Frame (Stairs Goal 1, PT) long term goal (LTG);10 days  -NW      Progress/Outcome (Stairs Goal 1, PT) goal met  -NW         Patient Education Goal (PT)    Activity (Patient Education Goal, PT) HEP for strengthening and motor control  -NW      [place]/Cues/Accuracy (Memory Goal 2, PT) demonstrates adequately;independent;verbalizes understanding  -NW      Time Frame (Patient Education Goal, PT) long term goal (LTG);10 days  -NW      Progress/Outcome (Patient Education Goal, PT) goal not met  -NW                User Key  (r) = Recorded By, (t) = Taken By, (c) = Cosigned By      Initials Name Provider Type Discipline    NW Netta Barrett PTA Physical Therapist Assistant PT                    Therapy Charges for Today       Code Description Service Date Service Provider Modifiers Qty    [id number] HC GAIT TRAINING EA 15 MIN 9/19/2023 Netta Barrett PTA GP 1             PT Discharge Summary  Anticipated Discharge Disposition (PT): home  Reason for Discharge: Discharge from facility  Outcomes Achieved: Refer to plan of care for updates on goals achieved  Discharge Destination: Home with outpatient services      Netta Barrett, PTA   9/19/2023

## 2023-09-19 NOTE — THERAPY TREATMENT NOTE
"Acute Care - Physical Therapy Treatment Note  [facility]     Patient Name: Des Silveira  : 1960  MRN: [id number]  Today's Date: 2023      Visit Dx:     ICD-10-CM ICD-9-CM   1. Cerebrovascular accident (CVA), unspecified mechanism  I63.9 434.91   2. Cognitive changes  R41.89 799.59   3. Gait abnormality [R26.9 (ICD-10-CM)]  R26.9 781.2     Patient Active Problem List   Diagnosis    Stroke    Cerebrovascular accident (CVA) of left thalamus    Sequela of lacunar infarction    Elevated blood pressure reading    Tobacco abuse    Hyperlipidemia     Past Medical History:   Diagnosis Date    History of transfusion 1964    Stroke 2023     History reviewed. No pertinent surgical history.  PT Assessment (last 12 hours)       PT Evaluation and Treatment       Row Name 23 0844          Physical Therapy Time and Intention    Subjective Information complains of;weakness  \"my leg just feel heavy\"  -NW     Document Type therapy note (daily note)  -NW     Mode of Treatment physical therapy  -       Row Name 23 0844          General Information    Existing Precautions/Restrictions fall  -       Row Name 23 0844          Pain    Pretreatment Pain Rating 0/10 - no pain  -[facility] Name 2344          Bed Mobility    Comment, (Bed Mobility) chair  -       Row Name 2344          Sit-Stand Transfer    Sit-Stand [place] (Transfers) verbal cues;standby assist  -       Row Name 2344          Stand-Sit Transfer    Stand-Sit [place] (Transfers) supervision  -       Row Name 23 0844          Gait/Stairs (Locomotion)    [place] Level (Gait) verbal cues;contact guard;standby assist  -NW     Distance in Feet (Gait) 100x2  -NW     Deviations/Abnormal Patterns (Gait) sara decreased  -NW     [place] Level (Stairs) verbal cues;contact guard;stand by assist  -NW     Handrail Location (Stairs) left side (ascending)  -NW     Number of Steps " (Stairs) 4  -NW     Ascending Technique (Stairs) step-over-step  -NW     Descending Technique (Stairs) step-over-step  -NW     Comment, (Gait/Stairs) reviewed home safety and POC, feel pt would benfit from d/c home w/ OP therapy  -NW       Row Name 09/19/23 0844          Safety Issues, Functional Mobility    Impairments Affecting Function (Mobility) balance;cognition;coordination  -NW       Row Name 09/19/23 0844          Motor Skills    Therapeutic Exercise aerobic  -NW     Additional Documentation Advanced Stepping/Walking Interventions (Group)  -NW       Row Name 09/19/23 0844          Aerobic Exercise    Time Performed (Aerobic Exercise) AROM BLES standing  -NW       Row Name 09/19/23 0844          Advanced Stepping/Walking Interventions    Stepping/Walking Interventions backward walking;side stepping  -NW       Row Name 09/19/23 0844          Positioning and Restraints    Pre-Treatment Position sitting in chair/recliner  -NW     Post Treatment Position chair  -NW     In Chair sitting;call light within reach;encouraged to call for assist;notified nsg  -NW               User Key  (r) = Recorded By, (t) = Taken By, (c) = Cosigned By      Initials Name Provider Type    Netta Bhandari, PTA Physical Therapist Assistant                    Physical Therapy Education       Title: PT OT SLP Therapies (Done)       Topic: Physical Therapy (Done)       Point: Mobility training (Done)       Learning Progress Summary             Patient Acceptance, E,TB,SCOOTER, THELMA,ANDERSON,NR by YULIA at 9/18/2023 1626    Comment: education re: purpose of PT/importance of activity, for safety/falls prevention, to increase awareness of deficits and fall risk, for R LE exercise for strengthening and motor control and for R UE providing pt w/ a squeeze ball   Significant Other Acceptance, E,TB,SCOOTER, THELMA,ANDERSON,NR by  at 9/18/2023 1626    Comment: education re: purpose of PT/importance of activity, for safety/falls prevention, to increase awareness of deficits  and fall risk, for R LE exercise for strengthening and motor control and for R UE providing pt w/ a squeeze ball                         Point: Home exercise program (Done)       Learning Progress Summary             Patient Acceptance, E,TB,D, VU,DU,NR by  at 9/18/2023 1626    Comment: education re: purpose of PT/importance of activity, for safety/falls prevention, to increase awareness of deficits and fall risk, for R LE exercise for strengthening and motor control and for R UE providing pt w/ a squeeze ball   Significant Other Acceptance, E,TB,D, VU,DU,NR by  at 9/18/2023 1626    Comment: education re: purpose of PT/importance of activity, for safety/falls prevention, to increase awareness of deficits and fall risk, for R LE exercise for strengthening and motor control and for R UE providing pt w/ a squeeze ball                         Point: Precautions (Done)       Learning Progress Summary             Patient Acceptance, E,TB,D, VU,DU,NR by  at 9/18/2023 1626    Comment: education re: purpose of PT/importance of activity, for safety/falls prevention, to increase awareness of deficits and fall risk, for R LE exercise for strengthening and motor control and for R UE providing pt w/ a squeeze ball   Significant Other Acceptance, E,TB,D, VU,DU,NR by  at 9/18/2023 1626    Comment: education re: purpose of PT/importance of activity, for safety/falls prevention, to increase awareness of deficits and fall risk, for R LE exercise for strengthening and motor control and for R UE providing pt w/ a squeeze ball                                         User Key       Initials Effective Dates Name Provider Type Discipline     08/02/18 -  Marina Ham PT Physical Therapist PT                  PT Recommendation and Plan             Time Calculation:    PT Charges       Row Name 09/19/23 0901             Time Calculation    Start Time 0844  -NW      Stop Time 0901  -NW      Time Calculation (min) 17 min  -NW       PT Received On 09/19/23  -NW      PT Goal Re-Cert Due Date 09/28/23  -NW         Time Calculation- PT    Total Timed Code Minutes- PT 17 minute(s)  -NW         Timed Charges    97018 - Gait Training Minutes  17  -NW         Total Minutes    Timed Charges Total Minutes 17  -NW       Total Minutes 17  -NW                User Key  (r) = Recorded By, (t) = Taken By, (c) = Cosigned By      Initials Name Provider Type    NW Netta Barrett, PTA Physical Therapist Assistant                  Therapy Charges for Today       Code Description Service Date Service Provider Modifiers Qty    07432857515 HC GAIT TRAINING EA 15 MIN 9/19/2023 Netta Barrett PTA GP 1            PT G-Codes  Outcome Measure Options: AM-PAC 6 Clicks Basic Mobility (PT)  AM-PAC 6 Clicks Score (PT): 21  AM-PAC 6 Clicks Score (OT): 24  Modified [place] Scale: 4 - Moderately severe disability.  Unable to walk without assistance, and unable to attend to own bodily needs without assistance.    Netta Barrett PTA  9/19/2023

## 2023-09-19 NOTE — DISCHARGE SUMMARY
[facility] Medicine Services  DISCHARGE SUMMARY       Date of Admission: 9/17/2023  Date of Discharge:  9/19/2023  Primary Care Physician: Provider, No Known    Presenting Problem/History of Present Illness:  Adapted from H&P from 9/17/2023  63 year old male with no significant PMH that presents to the ER with complains of numbness in right upper and lower extremities of sudden onset last night at about 1 AM. He was not sure what it was and decided to come to the ER this morning as symptoms persisted. He presented 9 hours after the onset of symptoms. No prior episodes, no associated chest pain, palpitations or weakness.      Final Discharge Diagnoses:  Active Hospital Problems    Diagnosis     **Stroke     Hyperlipidemia     Cerebrovascular accident (CVA) of left thalamus     Sequela of lacunar infarction     Elevated blood pressure reading     Tobacco abuse        Consults: neurology    Procedures Performed: none     Pertinent Test Results:       Imaging Results (All)       Procedure Component Value Units Date/Time    CT Angiogram Neck [[id number]] Collected: 09/17/23 1645     Updated: 09/17/23 1651    Narrative:      CT ANGIOGRAM NECK- 9/17/2023 3:49 PM CDT     HISTORY: Stroke, follow up; I63.9-Cerebral infarction, unspecified      COMPARISON: None     DOSE LENGTH PRODUCT: 175 mGy cm. Automated exposure control was also  utilized to decrease patient radiation dose.     TECHNIQUE: Axial images the neck are performed following IV contrast.  2-D and maximal intensity projectional reconstructed images are reviewed     FINDINGS:  Normal caliber thoracic aortic arch. Appropriate origin of  the great vessels from the arch. Right brachiocephalic artery is patent.  No visible subclavian artery stenosis. Decreased visualization of the  left subclavian artery from streak artifact created from the left upper  extremity venous bolus.     Mild tortuosity of the maximal common carotid  arteries which appear  widely patent. Minimal plaque of the carotid bulbs with no significant  bilateral internal or external carotid artery stenosis. Prominent  tortuosity of the patent distal left internal carotid artery.     The vertebral arteries originate from the proximal subclavian arteries  as expected. Hypoplastic left vertebral artery. No focal high-grade  vertebral artery stenosis.     No aneurysm or dissection.     Peripheral emphysema noted of the upper lobes. No pathologic  lymphadenopathy or soft tissue mass identified within the neck.     Moderate diffuse degenerative change of the cervical spine with reversal  normal cervical lordosis.       Impression:      1. Widely patent bilateral extracranial carotid arteries with tortuosity  as described above.  2. Patent vertebral arteries. Hypoplastic left vertebral artery.  3. No regional aneurysm or dissection.  4. Mild emphysema of the visible upper lobes.  This report was finalized on 09/17/2023 16:48 by Dr. Kristine Palacios MD.    CT Angiogram Head [[id number]] Collected: 09/17/23 1642     Updated: 09/17/23 1648    Narrative:      CT ANGIOGRAM HEAD- 9/17/2023 3:49 PM CDT     HISTORY: Stroke, follow up; I63.9-Cerebral infarction, unspecified      COMPARISON: None     DOSE LENGTH PRODUCT: 175 mGy cm. Automated exposure control was also  utilized to decrease patient radiation dose.     TECHNIQUE: Axial images the brain are performed following IV contrast.  2-D and maximal intensity projectional reconstructed images are reviewed     FINDINGS:  The distal bilateral internal carotid arteries are patent  with mild calcification of the cavernous segments. Mild hypoplasia of  the bilateral A1 anterior cerebral arteries. Bilateral middle cerebral  arteries are patent. Hypoplastic left distal vertebral artery. Patent  right normal caliber vertebral artery. Basilar artery is patent.  Bilateral posterior cerebral arteries are patent. The superior and  inferior  cerebellar arteries appear normal in caliber. The [place] of  [place] appears intact. No large vessel occlusion. No aneurysm or  dissection. No abnormal intracranial enhancement identified.       Impression:      1. No large vessel occlusion. No aneurysm or dissection. [place] of  [place] appears intact.  This report was finalized on 09/17/2023 16:44 by Dr. Kristine Palacios MD.    MRI Brain With & Without Contrast [[id number]] Collected: 09/17/23 1629     Updated: 09/17/23 1637    Narrative:      HISTORY: Stroke follow-up     MRI BRAIN: Multiplanar imaging the brain performed pre- and post-IV  contrast.     COMPARISON: CT head 09/17/2023     FINDINGS: There is prominent susceptibility artifact associated with the  small metallic fragment in the scalp of the right forehead.     There is a 9 mm focus of diffusion restriction within the left thalamus  representing acute nonhemorrhagic ischemia of the left thalamus. No  additional diffusion restriction identified. No intracranial blood  products localized. Ventricles are normal in size and configuration. Old  lacunar infarct adjacent the left basal ganglia suspected. There is no  abnormal intracranial enhancement identified. Corpus callosum intact. No  sellar mass.     Small mucous retention cyst suspected of left maxillary sinus with mild  mucosal thickening of the abdomen sinuses. Limited assessment of the  orbits and base of skull is unremarkable.       Impression:      1. Acute nonhemorrhagic ischemia of the left thalamus. Findings called  to the nurse caring for the patient on the 3A leslie at 4:34 PM on  09/17/2023.  2. Old lacunar infarct adjacent the left basal ganglia.  3. Susceptibility artifact associated with a retained metallic fragment  within the soft tissues of the right forehead.  4. No abnormal intracranial enhancement.  This report was finalized on 09/17/2023 16:34 by Dr. Kristine Palacios MD.    CT Head Without Contrast [[id number]] Collected: 09/17/23 1132      Updated: 09/17/23 1139    Narrative:      EXAMINATION:  CT HEAD WO CONTRAST-  9/17/2023 11:05 AM CDT     HISTORY: Right arm and leg numbness and weakness since last night.     TECHNIQUE: Multiple axial images were obtained through the brain without  contrast infusion. Multiplanar images were reconstructed.     DLP: 712 mGy-cm. Automated dosage reduction technique was utilized to  reduce patient dosage.     COMPARISON: No comparison study.     FINDINGS: There is a small area of cortical low density in the left  frontal-parietal convexity region (axial image 23 series 3 and coronal  image 9 series 7). There are no hemorrhage, edema or mass effect. There  is a 7 mm CSF density lesion in the left basal ganglia region  anteriorly. The ventricular system is nondilated. There is mild vascular  calcification. The paranasal sinuses and mastoid air cells are clear. No  calvarial fracture is seen.          Impression:      1. A small area of cortical low density in the left frontal-parietal  convexity may represent a small infarct.  2. No hemorrhage, edema or mass effect.  3. A 7 mm area of CSF density in the left basal ganglia region  anteriorly may represent a small chronic lacunar infarct or a dilated  perivascular space.        The full report of this exam was immediately signed and available to the  emergency room. The patient is currently in the emergency room.     This report was finalized on 09/17/2023 11:36 by Dr. Oral Silveira MD.    XR Chest 1 View [[id number]] Collected: 09/17/23 1118     Updated: 09/17/23 1122    Narrative:      EXAMINATION:  XR CHEST 1 VW-  9/17/2023 10:48 AM CDT     HISTORY: Stroke evaluation.     COMPARISON: No comparison study.     TECHNIQUE: Single view AP image.     FINDINGS:  The lungs are expanded bilaterally and clear. There is stable  mild elevation/eventration of the right hemidiaphragm. The pleural  spaces are clear. Heart size is normal. No acute bony abnormality is  seen.           "Impression:      No active disease is seen.        This report was finalized on 09/17/2023 11:19 by Dr. Oral Silveira MD.          LAB RESULTS:      Lab 09/18/23  0518 09/17/23  1100   WBC 7.10 6.96   HEMOGLOBIN 16.4 15.7   HEMATOCRIT 49.9 47.2   PLATELETS 212 220   NEUTROS ABS  --  4.70   IMMATURE GRANS (ABS)  --  0.02   LYMPHS ABS  --  1.54   MONOS ABS  --  0.61   EOS ABS  --  0.06   MCV 93.1 91.8   PROTIME  --  14.4         Lab 09/18/23  0518 09/17/23  1100   SODIUM 139 138   POTASSIUM 4.2 4.2   CHLORIDE 105 105   CO2 23.0 23.0   ANION GAP 11.0 10.0   BUN 15 15   CREATININE 0.95 1.01   EGFR 91.1 84.6   GLUCOSE 94 161*   CALCIUM 9.4 9.2   MAGNESIUM  --  2.2   HEMOGLOBIN A1C 5.70*  --          Lab 09/18/23  0518 09/17/23  1100   TOTAL PROTEIN 7.6 7.3   ALBUMIN 4.3 4.1   GLOBULIN 3.3 3.2   ALT (SGPT) 11 10   AST (SGOT) 12 13   BILIRUBIN 0.9 0.5   ALK PHOS 82 83         Lab 09/17/23  1100   PROTIME 14.4   INR 1.11*         Lab 09/18/23  0518   CHOLESTEROL 215*   LDL CHOL 160*   HDL CHOL 29*   TRIGLYCERIDES 139             Brief Urine Lab Results       None          Microbiology Results (last 10 days)       ** No results found for the last 240 hours. **            Hospital Course: 63-year-old male on admission for CVA with MRI showing an acute nonhemorrhagic ischemia of the left thalamus. Patient did not receive tPA due to being outside the window for therapy. Patient seen by neurology and was started on aspirin, and Lipitor with recommendation for better blood pressure control. Patient did not have a PCP. Patient started on Losartan and discussed about establishing with a PCP. Patient will follow up with neurology outpatient in 6-8 weeks.  Patient was counseled on smoking cessation.  Patient will continue with PT OT at discharge.      Physical Exam on Discharge:  /75   Pulse 66   Temp 97.8 °F (36.6 °C) (Oral)   Resp 16   Ht 180.3 cm (71\")   Wt 98.4 kg (217 lb)   SpO2 99%   BMI 30.27 kg/m²   Physical " Exam  Vitals and nursing note reviewed.   Constitutional:       General: He is not in acute distress.     Appearance: Normal appearance. He is not diaphoretic.   HENT:      Head: Normocephalic and atraumatic.      Right Ear: External ear normal.      Left Ear: External ear normal.   Eyes:      General: No scleral icterus.     Extraocular Movements: Extraocular movements intact.      Conjunctiva/sclera: Conjunctivae normal.   Cardiovascular:      Rate and Rhythm: Normal rate and regular rhythm.      Heart sounds: Normal heart sounds.   Pulmonary:      Effort: Pulmonary effort is normal. No respiratory distress.      Breath sounds: Normal breath sounds.   Chest:      Chest wall: No tenderness.   Abdominal:      General: Bowel sounds are normal.      Palpations: Abdomen is soft.      Tenderness: There is no abdominal tenderness.   Musculoskeletal:         General: No swelling or tenderness.      Cervical back: Normal range of motion and neck supple.      Right lower leg: No edema.      Left lower leg: No edema.   Skin:     General: Skin is warm and dry.      Capillary Refill: Capillary refill takes less than 2 seconds.      Findings: No erythema or rash.   Neurological:      General: No focal deficit present.      Mental Status: He is alert and oriented to person, place, and time.      Motor: No weakness.   Psychiatric:         Behavior: Behavior normal.         Condition on Discharge: stable/improving     Discharge Disposition:  Home or Self Care    Discharge Medications:     Discharge Medications        New Medications        Instructions Start Date   aspirin 81 MG chewable tablet   81 mg, Oral, Daily   Start Date: September 20, 2023     atorvastatin 80 MG tablet  Commonly known as: LIPITOR   80 mg, Oral, Nightly      losartan 50 MG tablet  Commonly known as: COZAAR   50 mg, Oral, Every 24 Hours Scheduled   Start Date: September 20, 2023                Discharge Diet:   Diet Instructions       Diet: Cardiac Diets;  Healthy Heart (2-3 Na+); Regular Texture (IDDSI 7); Thin (IDDSI 0)      Discharge Diet: Cardiac Diets    Cardiac Diet: Healthy Heart (2-3 Na+)    Texture: Regular Texture (IDDSI 7)    Fluid Consistency: Thin (IDDSI 0)            Activity at Discharge:   Activity Instructions       Activity as Tolerated              Follow-up Appointments:   Discharge Follow-up with Specialty: Neurology; 6 Weeks  What's Next  What's Next         Follow up with No Known Provider [place] [id number]          Ambulatory Referral to Family Practice  Family Medicine  Establish with a PCP close to home           Ambulatory Referral to Physical Therapy Neuro, Evaluate and treat  Physical Therapy           Referral to Occupational Therapy  Occupational Therapy    Discharge Follow-up with Specialty: Neurology; 6 Weeks      Test Results Pending at Discharge: echo    Electronically signed by Erlinda Aparicio MD, 09/19/23, 09:56 CDT.    Time: 32 minutes.

## 2023-09-19 NOTE — PLAN OF CARE
Goal Outcome Evaluation:  Plan of Care Reviewed With: patient        Progress: improving  Outcome Evaluation: VSS. A&Ox4. Tele NSR. C/o numbness on R extremities. NIHSS 1. Up with standby assist. No c/o pain. Call light within reach. Safety maintained.

## 2023-09-20 NOTE — OUTREACH NOTE
Prep Survey      Flowsheet Row Responses   [facility] facility patient discharged from? [place]   Is LACE score < 7 ? Yes   Eligibility Readm Mgmt   Discharge diagnosis Stroke   Does the patient have one of the following disease processes/diagnoses(primary or secondary)? Stroke   Does the patient have Home health ordered? No   Is there a DME ordered? No   Medication alerts for this patient See AVS   Prep survey completed? Yes            Ramona LOAIZA - Registered Nurse

## 2023-09-21 LAB
QT INTERVAL: 360 MS
QTC INTERVAL: 445 MS

## 2023-09-26 ENCOUNTER — READMISSION MANAGEMENT (OUTPATIENT)
Dept: CALL CENTER | Facility: HOSPITAL | Age: 63
End: 2023-09-26
Payer: COMMERCIAL

## 2023-09-26 NOTE — OUTREACH NOTE
Stroke Week 1 Survey      Flowsheet Row Responses   [facility] [place] patient discharged from? [place]   Does the patient have one of the following disease processes/diagnoses(primary or secondary)? Stroke   Week 1 attempt successful? Yes   Call start time 1326   Call end time 1326   Discharge diagnosis Stroke   Meds reviewed with patient/caregiver? Yes   Is the patient having any side effects they believe may be caused by any medication additions or changes? No   Does the patient have all medications ordered at discharge? Yes   Is the patient taking all medications as directed (includes completed medication regime)? Yes   Does the patient have a primary care provider?  Yes   Comments regarding PCP Patient states he has a new pcp appointment on 10/31/23   Has the patient kept scheduled appointments due by today? N/A   Has home health visited the patient within 72 hours of discharge? N/A   Does the patient require any assistance with activities of daily living such as eating, bathing, dressing, walking, etc.? No   Does the patient have any residual symptoms from stroke/TIA? No   Does the patient understand the diet ordered at discharge? Yes   Did the patient receive a copy of their discharge instructions? Yes   Nursing interventions Reviewed instructions with patient   What is the patient's perception of their health status since discharge? Improving   Nursing interventions Nurse provided patient education   Is the patient/caregiver able to teach back the risk factors for a stroke? High blood pressure-goal below 120/80, High Cholesterol, Smoking, Carotid or other artery disease, History of TIAs   Is the patient/caregiver able to teach back signs and symptoms related to disease process for when to call PCP? Yes   Is the patient/caregiver able to teach back signs and symptoms related to disease process for when to call 911? Yes   If the patient is a current smoker, are they able to teach back resources for cessation?  [id number]   Is the patient/caregiver able to teach back the hierarchy of who to call/visit for symptoms/problems? PCP, Specialist, Home health nurse, Urgent Care, ED, 911 Yes   Is the patient able to teach back FAST for Stroke? B alance: Watch for sudden loss of balance, E yes: Check for vision loss, F ace: Look for an uneven smile, A rm: Check if one arm is weak, S peech: Listen for slurred speech, T nathalie: Call 9-1-1 right away   Week 1 call completed? Yes   Call end time 1326            Carmen IZAGUIRRE - Licensed Nurse

## 2023-10-04 ENCOUNTER — READMISSION MANAGEMENT (OUTPATIENT)
Dept: CALL CENTER | Facility: HOSPITAL | Age: 63
End: 2023-10-04
Payer: COMMERCIAL

## 2023-10-04 NOTE — OUTREACH NOTE
Stroke Week 2 Survey      Flowsheet Row Responses   [facility] facility patient discharged from? [place]   Does the patient have one of the following disease processes/diagnoses(primary or secondary)? Stroke   Week 2 attempt successful? No   Unsuccessful attempts Attempt 1            Pilar IZAGUIRRE - Registered Nurse

## 2023-10-30 ENCOUNTER — TELEPHONE (OUTPATIENT)
Dept: NEUROLOGY | Facility: CLINIC | Age: 63
End: 2023-10-30
Payer: COMMERCIAL

## 2023-10-31 ENCOUNTER — TELEPHONE (OUTPATIENT)
Dept: NEUROLOGY | Facility: CLINIC | Age: 63
End: 2023-10-31

## 2023-10-31 ENCOUNTER — OFFICE VISIT (OUTPATIENT)
Dept: NEUROLOGY | Facility: CLINIC | Age: 63
End: 2023-10-31
Payer: COMMERCIAL

## 2023-10-31 ENCOUNTER — PATIENT ROUNDING (BHMG ONLY) (OUTPATIENT)
Dept: NEUROLOGY | Facility: CLINIC | Age: 63
End: 2023-10-31
Payer: COMMERCIAL

## 2023-10-31 VITALS
WEIGHT: 218 LBS | HEART RATE: 68 BPM | DIASTOLIC BLOOD PRESSURE: 80 MMHG | SYSTOLIC BLOOD PRESSURE: 132 MMHG | BODY MASS INDEX: 30.52 KG/M2 | RESPIRATION RATE: 18 BRPM | HEIGHT: 71 IN

## 2023-10-31 DIAGNOSIS — I69.30 HISTORY OF STROKE WITH RESIDUAL DEFICIT: Primary | ICD-10-CM

## 2023-10-31 DIAGNOSIS — R20.0 RIGHT SIDED NUMBNESS: ICD-10-CM

## 2023-10-31 DIAGNOSIS — Z75.8 DOES NOT HAVE PRIMARY CARE PROVIDER: ICD-10-CM

## 2023-10-31 DIAGNOSIS — E78.5 HYPERLIPIDEMIA LDL GOAL <70: ICD-10-CM

## 2023-10-31 DIAGNOSIS — Z87.891 HISTORY OF TOBACCO USE: ICD-10-CM

## 2023-10-31 DIAGNOSIS — I10 ESSENTIAL HYPERTENSION: ICD-10-CM

## 2023-10-31 NOTE — PROGRESS NOTES
Neurology Consult Note    [facility]  [address], [address]  [place] [address]  Phone: [phone]  Fax: [phone]    Referring Provider:   No ref. provider found  Primary Care Provider:  Provider, No Known    Reason for Consult:  Stroke   Subjective      Des Silveira presents to [facility]    History of Present Illness  63-year-old right-handed male seen for follow-up of stroke.  Past history significant for tobacco use, hyperlipidemia, hypertension, and no history of following with PCP.  He does not routinely take medications.  Patient presented to our facility on 9/17/2023 with complaints of right arm and leg numbness with weakness as well as right facial numbness.  His last known well was 9/16/2023 2100.  He arrived to the emergency room the following day at approximately 10:30 AM.  Reportedly patient fell sleep at his computer.  He was sitting up in a chair.  When he awoke around midnight, he went to walk to bed and fell due to weakness in his right leg.  Initial NIH of 4.  Patient was not a thrombolytic candidate due to last known well greater than 4.5 hours.  He was not a thrombectomy candidate due to no large vessel occlusion.  He was admitted for further work-up.  This work-up will be outlined below.  Patient was ultimately discharged home.  He declined wanting inpatient rehab.    Today patient presents with his spouse and the child.  He reports continued right-sided numbness.  He has been compliant with his medications.  He reports he has stopped smoking since his stroke.  He does not monitor his blood pressure at home.  Denies any new stroke symptoms.  He has not established care with a primary care provider.  He denies any falls.  He has not gone back to work secondary to wanting to be seen first.  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  He does not operate heavy machinery at work.  Does utilize power tools.    Patient Active  "Problem List   Diagnosis    Stroke    Cerebrovascular accident (CVA) of left thalamus    [place] of lacunar infarction    Elevated blood pressure reading    Tobacco abuse    Hyperlipidemia        Past Medical History:   Diagnosis Date    History of transfusion 09/20/1964    Stroke 09/17/2023        Social History     Socioeconomic History    Marital status:    Tobacco Use    Smoking status: Former     Types: Cigars     Start date: 9/16/1979    Smokeless tobacco: Current   Vaping Use    Vaping Use: Never used   Substance and Sexual Activity    Alcohol use: Yes     Alcohol/week: 1.0 standard drink of alcohol     Types: 1 Cans of beer per week    Drug use: Never    Sexual activity: Yes        No Known Allergies       Current Outpatient Medications:     aspirin 81 MG chewable tablet, Chew and swallow 1 tablet by mouth once daily for 90 days., Disp: 90 tablet, Rfl: 0    atorvastatin (LIPITOR) 80 MG tablet, Take 1 tablet by mouth Every Night., Disp: 90 tablet, Rfl: 0    losartan (COZAAR) 50 MG tablet, Take 1 tablet by mouth Daily for 90 days., Disp: 90 tablet, Rfl: 0       Objective   Vital Signs:   /80 (BP Location: Left arm, Patient Position: Sitting)   Pulse 68   Resp 18   Ht 180.3 cm (71\")   Wt 98.9 kg (218 lb)   BMI 30.40 kg/m²       Physical Exam  Constitutional:       General: He is not in acute distress.  HENT:      Head: Normocephalic.      Mouth/Throat:      Mouth: Mucous membranes are moist. Mucous membranes are dry.   Eyes:      General: Lids are normal.      Extraocular Movements: Extraocular movements intact.      Pupils: Pupils are equal, round, and reactive to light.   Cardiovascular:      Rate and Rhythm: Normal rate and regular rhythm.      Comments: No carotid bruit bilaterally  Pulmonary:      Effort: Pulmonary effort is normal. No respiratory distress.   Skin:     General: Skin is warm and dry.   Neurological:      Mental Status: He is alert.      Motor: Motor strength is normal.     " Deep Tendon Reflexes: Reflexes are normal and symmetric.   Psychiatric:         Speech: Speech normal.        Neurological Exam  Mental Status  Alert. Oriented to person, place, time and situation. Speech is normal. Language is fluent with no aphasia.    Cranial Nerves  CN II: Visual fields full to confrontation.  CN III, IV, VI: Extraocular movements intact bilaterally. Normal lids and orbits bilaterally. Pupils equal round and reactive to light bilaterally.  CN V:  Right: Diminished sensation of the entire right side of the face. Facial numbness.  Patient also describes burning..  CN VII: Full and symmetric facial movement.  CN IX, X: Palate elevates symmetrically. Normal gag reflex.  CN XI: Shoulder shrug strength is normal.  CN XII: Tongue midline without atrophy or fasciculations.    Motor  Normal muscle bulk throughout. No fasciculations present. Normal muscle tone. No abnormal involuntary movements. Strength is 5/5 throughout all four extremities.    Sensory  Light touch abnormality: Right side less leg and arm compared to left.. Pinprick abnormality: Right side less and leg and arm compared to left. Vibration is normal in upper and lower extremities.     Reflexes  Deep tendon reflexes are 2+ and symmetric in all four extremities.    Coordination  Right: Finger-to-nose normal. Heel-to-shin normal.Left: Finger-to-nose normal. Heel-to-shin normal.    Gait  Casual gait is normal including stance, stride, and arm swing.      Result Review :   The following data was reviewed by: DAIANA Gunderson on 10/31/2023:  CMP          9/17/2023    11:00 9/18/2023    05:18   CMP   Glucose 161  94    BUN 15  15    Creatinine 1.01  0.95    EGFR 84.6  91.1    Sodium 138  139    Potassium 4.2  4.2    Chloride 105  105    Calcium 9.2  9.4    Total Protein 7.3  7.6    Albumin 4.1  4.3    Globulin 3.2  3.3    Total Bilirubin 0.5  0.9    Alkaline Phosphatase 83  82    AST (SGOT) 13  12    ALT (SGPT) 10  11    Albumin/Globulin  Ratio 1.3  1.3    BUN/Creatinine Ratio 14.9  15.8    Anion Gap 10.0  11.0      CBC w/diff          9/17/2023    11:00 9/18/2023    05:18   CBC w/Diff   WBC 6.96  7.10    RBC 5.14  5.36    Hemoglobin 15.7  16.4    Hematocrit 47.2  49.9    MCV 91.8  93.1    MCH 30.5  30.6    MCHC 33.3  32.9    RDW 11.9  12.3    Platelets 220  212    Neutrophil Rel % 67.5     Immature Granulocyte Rel % 0.3     Lymphocyte Rel % 22.1     Monocyte Rel % 8.8     Eosinophil Rel % 0.9     Basophil Rel % 0.4       Lipid Panel          9/18/2023    05:18   Lipid Panel   Total Cholesterol 215    Triglycerides 139    HDL Cholesterol 29    VLDL Cholesterol 26    LDL Cholesterol  160    LDL/HDL Ratio 5.46        Most Recent A1C          9/18/2023    05:18   HGBA1C Most Recent   Hemoglobin A1C 5.70    XR Chest 1 View (09/17/2023 11:06)    Study Result    Narrative & Impression   EXAMINATION:  XR CHEST 1 VW-  9/17/2023 10:48 AM CDT     HISTORY: Stroke evaluation.     COMPARISON: No comparison study.     TECHNIQUE: Single view AP image.     FINDINGS:  The lungs are expanded bilaterally and clear. There is stable  mild elevation/eventration of the right hemidiaphragm. The pleural  spaces are clear. Heart size is normal. No acute bony abnormality is  seen.        IMPRESSION:  No active disease is seen.        This report was finalized on 09/17/2023 11:19 by Dr. Oral Silveira MD.     CT Head Without Contrast (09/17/2023 11:11)  Study Result    Narrative & Impression   EXAMINATION:  CT HEAD WO CONTRAST-  9/17/2023 11:05 AM CDT     HISTORY: Right arm and leg numbness and weakness since last night.     TECHNIQUE: Multiple axial images were obtained through the brain without  contrast infusion. Multiplanar images were reconstructed.     DLP: 712 mGy-cm. Automated dosage reduction technique was utilized to  reduce patient dosage.     COMPARISON: No comparison study.     FINDINGS: There is a small area of cortical low density in the left  frontal-parietal  convexity region (axial image 23 series 3 and coronal  image 9 series 7). There are no hemorrhage, edema or mass effect. There  is a 7 mm CSF density lesion in the left basal ganglia region  anteriorly. The ventricular system is nondilated. There is mild vascular  calcification. The paranasal sinuses and mastoid air cells are clear. No  calvarial fracture is seen.        IMPRESSION:  1. A small area of cortical low density in the left frontal-parietal  convexity may represent a small infarct.  2. No hemorrhage, edema or mass effect.  3. A 7 mm area of CSF density in the left basal ganglia region  anteriorly may represent a small chronic lacunar infarct or a dilated  perivascular space.        The full report of this exam was immediately signed and available to the  emergency room. The patient is currently in the emergency room.     This report was finalized on 09/17/2023 11:36 by Dr. Oral Silveira MD.     MRI Brain With & Without Contrast (09/17/2023 16:25)  Study Result    Narrative & Impression   HISTORY: Stroke follow-up     MRI BRAIN: Multiplanar imaging the brain performed pre- and post-IV  contrast.     COMPARISON: CT head 09/17/2023     FINDINGS: There is prominent susceptibility artifact associated with the  small metallic fragment in the scalp of the right forehead.     There is a 9 mm focus of diffusion restriction within the left thalamus  representing acute nonhemorrhagic ischemia of the left thalamus. No  additional diffusion restriction identified. No intracranial blood  products localized. Ventricles are normal in size and configuration. Old  lacunar infarct adjacent the left basal ganglia suspected. There is no  abnormal intracranial enhancement identified. Corpus callosum intact. No  sellar mass.     Small mucous retention cyst suspected of left maxillary sinus with mild  mucosal thickening of the abdomen sinuses. Limited assessment of the  orbits and base of skull is unremarkable.      IMPRESSION:  1. Acute nonhemorrhagic ischemia of the left thalamus. Findings called  to the nurse caring for the patient on the 3A leslie at 4:34 PM on  09/17/2023.  2. Old lacunar infarct adjacent the left basal ganglia.  3. Susceptibility artifact associated with a retained metallic fragment  within the soft tissues of the right forehead.  4. No abnormal intracranial enhancement.  This report was finalized on 09/17/2023 16:34 by Dr. Kristine Palacios MD.     MRI reviewed by me independently interpreted today as a an acute left thalamic infarction.  CT Angiogram Neck (09/17/2023 16:40)  Study Result    Narrative & Impression   CT ANGIOGRAM NECK- 9/17/2023 3:49 PM CDT     HISTORY: Stroke, follow up; I63.9-Cerebral infarction, unspecified      COMPARISON: None     DOSE LENGTH PRODUCT: 175 mGy cm. Automated exposure control was also  utilized to decrease patient radiation dose.     TECHNIQUE: Axial images the neck are performed following IV contrast.  2-D and maximal intensity projectional reconstructed images are reviewed     FINDINGS:  Normal caliber thoracic aortic arch. Appropriate origin of  the great vessels from the arch. Right brachiocephalic artery is patent.  No visible subclavian artery stenosis. Decreased visualization of the  left subclavian artery from streak artifact created from the left upper  extremity venous bolus.     Mild tortuosity of the maximal common carotid arteries which appear  widely patent. Minimal plaque of the carotid bulbs with no significant  bilateral internal or external carotid artery stenosis. Prominent  tortuosity of the patent distal left internal carotid artery.     The vertebral arteries originate from the proximal subclavian arteries  as expected. Hypoplastic left vertebral artery. No focal high-grade  vertebral artery stenosis.     No aneurysm or dissection.     Peripheral emphysema noted of the upper lobes. No pathologic  lymphadenopathy or soft tissue mass identified within  the neck.     Moderate diffuse degenerative change of the cervical spine with reversal  normal cervical lordosis.     IMPRESSION:  1. Widely patent bilateral extracranial carotid arteries with tortuosity  as described above.  2. Patent vertebral arteries. Hypoplastic left vertebral artery.  3. No regional aneurysm or dissection.  4. Mild emphysema of the visible upper lobes.  This report was finalized on 09/17/2023 16:48 by Dr. Kristine Palacios MD.     CT Angiogram Head (09/17/2023 16:40)  Study Result    Narrative & Impression   CT ANGIOGRAM HEAD- 9/17/2023 3:49 PM CDT     HISTORY: Stroke, follow up; I63.9-Cerebral infarction, unspecified      COMPARISON: None     DOSE LENGTH PRODUCT: 175 mGy cm. Automated exposure control was also  utilized to decrease patient radiation dose.     TECHNIQUE: Axial images the brain are performed following IV contrast.  2-D and maximal intensity projectional reconstructed images are reviewed     FINDINGS:  The distal bilateral internal carotid arteries are patent  with mild calcification of the cavernous segments. Mild hypoplasia of  the bilateral A1 anterior cerebral arteries. Bilateral middle cerebral  arteries are patent. Hypoplastic left distal vertebral artery. Patent  right normal caliber vertebral artery. Basilar artery is patent.  Bilateral posterior cerebral arteries are patent. The superior and  inferior cerebellar arteries appear normal in caliber. The [place] of  [place] appears intact. No large vessel occlusion. No aneurysm or  dissection. No abnormal intracranial enhancement identified.     IMPRESSION:  1. No large vessel occlusion. No aneurysm or dissection. [place] of  [place] appears intact.  This report was finalized on 09/17/2023 16:44 by Dr. Kristine Palacios MD.     Adult Transthoracic Echo Complete W/ Cont if Necessary Per Protocol (With Agitated Saline) (09/19/2023 08:13)     Interpretation Summary    Left ventricular systolic function is normal. Left ventricular  ejection fraction appears to be 61 - 65%.    Left ventricular wall thickness is consistent with mild to moderate concentric hypertrophy.    Normal right ventricular cavity size and systolic function noted.    No evidence of a patent foramen ovale.    Mild to moderate aortic valve regurgitation is present.    Mild dilation of the aortic root is present.   Interpreting Physicians  Performing Staff   Raymon Bolton MD Tech: Wallace Gwen                          Impression:  Des Silveira is a 63 y.o. male who presents for follow-up of stroke.  He had experienced a left thalamus infarction.  Etiology likely uncontrolled hypertension, hyperlipidemia, and tobacco abuse.  He has done well since his stroke.  He has no weakness on exam.  He does continue with sensory deficits on the right side compatible with his stroke.  Fortunately he has stopped smoking.  He does need to establish with a PCP for preventative maintenance.  I do continue to encourage secondary stroke preventative measures.  Patient was given 90-day supplies of medications while in the hospital.    Diagnoses and all orders for this visit:    1. History of stroke with residual deficit (Primary)  -     Ambulatory Referral to Internal Medicine    2. Right sided numbness    3. Does not have primary care provider  -     Ambulatory Referral to Internal Medicine    4. Essential hypertension  -     Ambulatory Referral to Internal Medicine    5. Hyperlipidemia LDL goal <70  -     Ambulatory Referral to Internal Medicine    6. History of tobacco use  -     Ambulatory Referral to Internal Medicine        Plan:  Continue aspirin 81 mg daily  Continue atorvastatin 80 mg nightly  LDL goal less than 70.  Currently 160.  A1c goal less than 6.5.  Currently at goal with 5.7.  BP goal less than 130/80.  Currently 132/80.  Reviewed BE-FAST and need for immediate valuation emergency department for new stroke symptoms.  Encouraged referral to outpatient Physical  and Occupational Therapy.  Patient declined due to financial concerns.  Educated patient that the first 90 days after stroke are very important for rehab.  He continues to decline saying he is doing exercises at home.  Increase activity as tolerated.  No activity restrictions.  Patient may return to work.  Referral placed to internal medicine with WALLACE Jaimes in [facility].   Followup with me in three months with recheck of lipid panel and A1c.     The patient and I have discussed the plan of care and he is in full agreement at this time.     I spent total of 60 minutes for this encounter.  This includes reviewing medical records, obtaining an thorough HPI, assessment of patient, developing a plan of care with the patient and spouse, patient and spouse education as well as documentation.  Follow Up   Return in about 3 months (around 1/31/2024) for Stroke.            DAIANA Gunderson  10/31/23  09:22 CDT

## 2023-10-31 NOTE — PROGRESS NOTES
October 31, 2023      My name is Grace Moore LPN      I am  with [facility]  [address]    [facility] [address] [phone].    Before we get started may I verify your date of birth? 1960    I am officially welcoming you to our practice and ask ing about your visit. Is this a good time to talk? yes    Tell me about your visit with us. What things went well?  Patient states everything went well with his visit.  He states Mcqueen did discuss everything in detail with he and his spousel       We're always looking for ways to make our patients' experiences even better. Do you have recommendations on ways we may improve?  no    Overall were you satisfied with your first visit to our practice? yes       I appreciate you taking the time to speak with me today. Is there anything else I can do for you? no      Thank you, and have a great day.

## 2023-10-31 NOTE — TELEPHONE ENCOUNTER
Provider: DECLAN GANDARA    Caller: PATIENT    Relationship to Patient: SELF      Phone Number: [phone]    Reason for Call: PT WAS SEEN IN THE OFFICE THIS MORNING AND STATES PROVIDER TOLD HIM IT WAS OKAY TO RETURN TO WORK.  PT'S EMPLOYER NEEDS A NOTE STATING IT IS OKAY FOR HIM TO RETURN TO WORK AND ANY RESTRICTIONS HE MAY HAVE TO BE LISTED.     PLEASE FAX TO     [email or web address]  FAX # [phone]     PT WOULD LIKE A CALL ONCE THIS HAS BEEN SENT    PLEASE REVIEW AND ADVISE     THANK YOU

## 2023-11-01 ENCOUNTER — DOCUMENTATION (OUTPATIENT)
Dept: NEUROLOGY | Facility: CLINIC | Age: 63
End: 2023-11-01
Payer: COMMERCIAL

## 2023-11-01 NOTE — PROGRESS NOTES
Faxed return letter to [employer] at [phone].   Pt awake. C/O back pain 4/10 scale. Facial grimace noted. Resp non labored on RA. Morphine 4mg sq given in left upper arm. Pt tolerated well. SR up x2. Bed low/locked. Call light with in reach. Family at the bedside.

## 2024-02-01 ENCOUNTER — TELEPHONE (OUTPATIENT)
Dept: NEUROLOGY | Facility: CLINIC | Age: 64
End: 2024-02-01
Payer: COMMERCIAL

## 2024-02-02 ENCOUNTER — OFFICE VISIT (OUTPATIENT)
Dept: NEUROLOGY | Facility: CLINIC | Age: 64
End: 2024-02-02
Payer: COMMERCIAL

## 2024-02-02 ENCOUNTER — LAB (OUTPATIENT)
Dept: LAB | Facility: HOSPITAL | Age: 64
End: 2024-02-02
Payer: COMMERCIAL

## 2024-02-02 VITALS
HEART RATE: 76 BPM | BODY MASS INDEX: 30.52 KG/M2 | HEIGHT: 71 IN | WEIGHT: 218 LBS | SYSTOLIC BLOOD PRESSURE: 134 MMHG | DIASTOLIC BLOOD PRESSURE: 76 MMHG | OXYGEN SATURATION: 96 %

## 2024-02-02 DIAGNOSIS — I69.30 HISTORY OF STROKE WITH RESIDUAL DEFICIT: Primary | ICD-10-CM

## 2024-02-02 DIAGNOSIS — R52 BURNING PAIN: Primary | ICD-10-CM

## 2024-02-02 DIAGNOSIS — R20.0 RIGHT SIDED NUMBNESS: ICD-10-CM

## 2024-02-02 DIAGNOSIS — I69.30 HISTORY OF STROKE WITH RESIDUAL DEFICIT: ICD-10-CM

## 2024-02-02 DIAGNOSIS — E78.5 HYPERLIPIDEMIA LDL GOAL <70: ICD-10-CM

## 2024-02-02 DIAGNOSIS — Z87.891 HISTORY OF TOBACCO USE: ICD-10-CM

## 2024-02-02 DIAGNOSIS — R52 BURNING PAIN: ICD-10-CM

## 2024-02-02 LAB
ALBUMIN SERPL-MCNC: 4 G/DL (ref 3.5–5.2)
ALBUMIN/GLOB SERPL: 1.3 G/DL
ALP SERPL-CCNC: 74 U/L (ref 39–117)
ALT SERPL W P-5'-P-CCNC: 25 U/L (ref 1–41)
ANION GAP SERPL CALCULATED.3IONS-SCNC: 7.6 MMOL/L (ref 5–15)
AST SERPL-CCNC: 20 U/L (ref 1–40)
BILIRUB SERPL-MCNC: 1 MG/DL (ref 0–1.2)
BUN SERPL-MCNC: 18 MG/DL (ref 8–23)
BUN/CREAT SERPL: 14.8 (ref 7–25)
CALCIUM SPEC-SCNC: 8.9 MG/DL (ref 8.6–10.5)
CHLORIDE SERPL-SCNC: 105 MMOL/L (ref 98–107)
CHOLEST SERPL-MCNC: 110 MG/DL (ref 0–200)
CO2 SERPL-SCNC: 26.4 MMOL/L (ref 22–29)
CREAT SERPL-MCNC: 1.22 MG/DL (ref 0.76–1.27)
EGFRCR SERPLBLD CKD-EPI 2021: 67.5 ML/MIN/1.73
GLOBULIN UR ELPH-MCNC: 3.2 GM/DL
GLUCOSE SERPL-MCNC: 86 MG/DL (ref 65–99)
HBA1C MFR BLD: 6 % (ref 4.8–5.6)
HDLC SERPL-MCNC: 33 MG/DL (ref 40–60)
LDLC SERPL CALC-MCNC: 56 MG/DL (ref 0–100)
LDLC/HDLC SERPL: 1.66 {RATIO}
POTASSIUM SERPL-SCNC: 4.3 MMOL/L (ref 3.5–5.2)
PROT SERPL-MCNC: 7.2 G/DL (ref 6–8.5)
SODIUM SERPL-SCNC: 139 MMOL/L (ref 136–145)
TRIGL SERPL-MCNC: 111 MG/DL (ref 0–150)
VLDLC SERPL-MCNC: 21 MG/DL (ref 5–40)

## 2024-02-02 PROCEDURE — 80061 LIPID PANEL: CPT

## 2024-02-02 PROCEDURE — 83036 HEMOGLOBIN GLYCOSYLATED A1C: CPT

## 2024-02-02 PROCEDURE — 36415 COLL VENOUS BLD VENIPUNCTURE: CPT

## 2024-02-02 PROCEDURE — 80053 COMPREHEN METABOLIC PANEL: CPT

## 2024-02-02 RX ORDER — ASPIRIN 81 MG/1
81 TABLET, COATED ORAL DAILY
COMMUNITY
Start: 2023-12-12

## 2024-02-02 RX ORDER — GABAPENTIN 100 MG/1
100 CAPSULE ORAL 3 TIMES DAILY
Qty: 90 CAPSULE | Refills: 3 | Status: SHIPPED | OUTPATIENT
Start: 2024-02-02

## 2024-02-02 NOTE — PROGRESS NOTES
Neurology Consult Note    [facility]  [address], [address]  [place] [address]  Phone: [phone]  Fax: [phone]    Referring Provider:   No ref. provider found  Primary Care Provider:  Andrea Sutherland PA-C    Reason for Consult:  Stroke   Subjective        Des Silveira presents to [facility]    History of Present Illness  63-year-old male who I follow for stroke.  He was last seen in clinic on 10/31/2023.  At that time he continued patient on aspirin 81 mg daily and atorvastatin 80 mg nightly.  Additionally placed referral to Andrea Sutherland PA-C for establishment of care.  Additionally last visit I did recommend patient to have physical therapy.  He denied this citing financial concerns.  Patient did continue with right-sided numbness.    Today patient presents his wife and granddaughter.  He does report to me continued right-sided numbness.  Additionally he has started experiencing burning and itching on the right side as well.  He reports he has seen his PCP since I saw him last.  Reports compliance with aspirin and Lipitor.  He has gone back to work with no issues.  He says he has gained weight since he stopped smoking.    Patient Active Problem List   Diagnosis    Stroke    Cerebrovascular accident (CVA) of left thalamus    Sequela of lacunar infarction    Elevated blood pressure reading    Tobacco abuse    Hyperlipidemia        Past Medical History:   Diagnosis Date    History of transfusion 09/20/1964    Stroke 09/17/2023        Social History     Socioeconomic History    Marital status:    Tobacco Use    Smoking status: Former     Types: Cigars     Start date: 9/16/1979    Smokeless tobacco: Current   Vaping Use    Vaping Use: Never used   Substance and Sexual Activity    Alcohol use: Yes     Alcohol/week: 1.0 standard drink of alcohol     Types: 1 Cans of beer per week    Drug use: Never    Sexual activity: Yes        No Known  "Allergies       Current Outpatient Medications:     Aspirin Low Dose 81 MG EC tablet, Take 1 tablet by mouth Daily., Disp: , Rfl:     atorvastatin (LIPITOR) 80 MG tablet, Take 1 tablet by mouth Every Night., Disp: 90 tablet, Rfl: 0    losartan (COZAAR) 50 MG tablet, Take 1 tablet by mouth Daily for 90 days., Disp: 90 tablet, Rfl: 0       Objective   Vital Signs:   /76 (BP Location: Left arm, Patient Position: Sitting, Cuff Size: Large Adult)   Pulse 76   Ht 180.3 cm (70.98\")   Wt 98.9 kg (218 lb)   SpO2 96%   BMI 30.42 kg/m²       Physical Exam  Vitals and nursing note reviewed.   Constitutional:       Appearance: Normal appearance.   HENT:      Head: Normocephalic.   Eyes:      General: Lids are normal.      Extraocular Movements: Extraocular movements intact.      Pupils: Pupils are equal, round, and reactive to light.   Pulmonary:      Effort: Pulmonary effort is normal. No respiratory distress.   Skin:     General: Skin is warm and dry.   Neurological:      Mental Status: He is alert.      Motor: Motor strength is normal.     Deep Tendon Reflexes: Reflexes are normal and symmetric.   Psychiatric:         Speech: Speech normal.        Neurological Exam  Mental Status  Alert. Oriented to person, place, time and situation. Speech is normal. Language is fluent with no aphasia.    Cranial Nerves  CN II: Visual fields full to confrontation.  CN III, IV, VI: Extraocular movements intact bilaterally. Normal lids and orbits bilaterally. Pupils equal round and reactive to light bilaterally.  CN V: Facial sensation is normal.  CN VII: Full and symmetric facial movement.  CN IX, X: Palate elevates symmetrically. Normal gag reflex.  CN XI: Shoulder shrug strength is normal.  CN XII: Tongue midline without atrophy or fasciculations.    Motor  Normal muscle bulk throughout. No fasciculations present. Normal muscle tone. No abnormal involuntary movements. Strength is 5/5 throughout all four " extremities.    Sensory  Light touch abnormality: Numbness right arm.     Reflexes  Deep tendon reflexes are 2+ and symmetric in all four extremities.    Coordination  Right: Finger-to-nose normal.Left: Finger-to-nose normal.    Gait  Casual gait is normal including stance, stride, and arm swing.      Result Review :   The following data was reviewed by: DAIANA Gunderson on 02/02/2024:  CMP          9/17/2023    11:00 9/18/2023    05:18   CMP   Glucose 161  94    BUN 15  15    Creatinine 1.01  0.95    EGFR 84.6  91.1    Sodium 138  139    Potassium 4.2  4.2    Chloride 105  105    Calcium 9.2  9.4    Total Protein 7.3  7.6    Albumin 4.1  4.3    Globulin 3.2  3.3    Total Bilirubin 0.5  0.9    Alkaline Phosphatase 83  82    AST (SGOT) 13  12    ALT (SGPT) 10  11    Albumin/Globulin Ratio 1.3  1.3    BUN/Creatinine Ratio 14.9  15.8    Anion Gap 10.0  11.0      CBC w/diff          9/17/2023    11:00 9/18/2023    05:18   CBC w/Diff   WBC 6.96  7.10    RBC 5.14  5.36    Hemoglobin 15.7  16.4    Hematocrit 47.2  49.9    MCV 91.8  93.1    MCH 30.5  30.6    MCHC 33.3  32.9    RDW 11.9  12.3    Platelets 220  212    Neutrophil Rel % 67.5     Immature Granulocyte Rel % 0.3     Lymphocyte Rel % 22.1     Monocyte Rel % 8.8     Eosinophil Rel % 0.9     Basophil Rel % 0.4       Lipid Panel          9/18/2023    05:18   Lipid Panel   Total Cholesterol 215    Triglycerides 139    HDL Cholesterol 29    VLDL Cholesterol 26    LDL Cholesterol  160    LDL/HDL Ratio 5.46        Most Recent A1C          9/18/2023    05:18   HGBA1C Most Recent   Hemoglobin A1C 5.70         Progress Notes by Charisse Bella APRN (10/31/2023 08:30)                Impression:  Des Silveira is a 63 y.o. male who presents for follow-up of stroke.  Patient is experienced a left thalamic stroke.  Etiology felt to be small vessel exacerbated by hypertension, hyperlipidemia and tobacco use.  Patient has stopped smoking.  He has been compliant with  medications.  As far as his symptoms of burning and itching, this is likely due to recovery process from his stroke.  However with it bothering patient, we can trial low-dose gabapentin to see if we can give him benefit.  I do recommend continuing strict secondary stroke preventative measures.    Diagnoses and all orders for this visit:    1. History of stroke with residual deficit (Primary)  -     Lipid Panel; Future  -     Hemoglobin A1c; Future  -     Comprehensive Metabolic Panel; Future    2. Right sided numbness  -     Lipid Panel; Future  -     Hemoglobin A1c; Future  -     Comprehensive Metabolic Panel; Future    3. Hyperlipidemia LDL goal <70  -     Lipid Panel; Future  -     Comprehensive Metabolic Panel; Future    4. Burning pain    5. History of tobacco use        Plan:  Continue aspirin 81 mg daily  Continue atorvastatin 80 mg nightly  Start gabapentin 100 mg capsule, 1 capsule 3 times daily  Repeat LDL  Repeat A1c  Will repeat CMP  LDL goal less than 70.  Currently 160.  A1c goal less than 6.5.  Currently at goal with 5.7.  BP goal less than 130/80.  Currently 134/76 in office.  Return to the emergency room for any new stroke symptoms  Recommend Mediterranean-style diet  Recommending increasing activity to include at least 30 minutes of moderate to vigorous exercise at least 3-4 times weekly  Follow-up with PCP as scheduled  Follow-up with me in 3 months or sooner if needed    The patient and I have discussed the plan of care and he is in full agreement at this time.     Follow Up   Return in about 3 months (around 5/2/2024) for Stroke.            Charisse Bella, DAIANA  02/02/24  09:21 CST

## 2024-04-10 ENCOUNTER — APPOINTMENT (OUTPATIENT)
Dept: GENERAL RADIOLOGY | Facility: HOSPITAL | Age: 64
End: 2024-04-10
Payer: COMMERCIAL

## 2024-04-10 PROCEDURE — 72100 X-RAY EXAM L-S SPINE 2/3 VWS: CPT

## 2024-05-17 ENCOUNTER — OFFICE VISIT (OUTPATIENT)
Dept: NEUROLOGY | Facility: CLINIC | Age: 64
End: 2024-05-17
Payer: COMMERCIAL

## 2024-05-17 VITALS
BODY MASS INDEX: 32.45 KG/M2 | DIASTOLIC BLOOD PRESSURE: 90 MMHG | OXYGEN SATURATION: 96 % | SYSTOLIC BLOOD PRESSURE: 140 MMHG | RESPIRATION RATE: 20 BRPM | HEIGHT: 71 IN | HEART RATE: 76 BPM | WEIGHT: 231.8 LBS

## 2024-05-17 DIAGNOSIS — I69.30 HISTORY OF STROKE WITH RESIDUAL DEFICIT: Primary | ICD-10-CM

## 2024-05-17 DIAGNOSIS — G89.0 THALAMIC PAIN SYNDROME: ICD-10-CM

## 2024-05-17 DIAGNOSIS — I10 ESSENTIAL HYPERTENSION: ICD-10-CM

## 2024-05-17 DIAGNOSIS — Z87.891 HISTORY OF TOBACCO USE: ICD-10-CM

## 2024-05-17 DIAGNOSIS — E78.5 HYPERLIPIDEMIA LDL GOAL <70: ICD-10-CM

## 2024-05-17 DIAGNOSIS — R20.0 RIGHT SIDED NUMBNESS: ICD-10-CM

## 2024-05-17 NOTE — PROGRESS NOTES
Neurology Consult Note    [facility]  [address], [address]  [place] [address]  Phone: [phone]  Fax: [phone]    Referring Provider:   No ref. provider found  Primary Care Provider:  Andrea Sutherland PA-C    Reason for Consult:  Stroke  Subjective        Des Silveira presents to [facility]    History of Present Illness  63-year-old male seen for follow-up of stroke.  Last outpatient in clinic on 2/2/2024.  During that visit patient reported continued right-sided numbness.  Patient also reported experiencing burning and itching on the right side as well.  Patient reports he has gone back to work without any issues.  He has stopped smoking however reports he gained weight.  We did recommend patient to continue aspirin 1 mg daily and atorvastatin 80 mg nightly.  Additionally started patient on gabapentin 100 mg capsule 3 times daily.  We also rechecked his LDL and A1c.    Today patient presents with his wife and grandchild.  Reports no new stroke symptoms.  Does note the burning sensation on his right side has improved.  He does remain on aspirin, Lipitor and the gabapentin we prescribed.  He does continue not to smoke.  He does continue to work without issues.  He reports no concerns at this time.    Of note patient was seen in urgent care on 4/10/2024 for complaint of back pain.  Appears patient was started on a prednisone pack as well as a Zanaflex prescription.  He does asked today for a refill on Zanaflex.  Patient Active Problem List   Diagnosis    Stroke    Cerebrovascular accident (CVA) of left thalamus    Sequela of lacunar infarction    Elevated blood pressure reading    Tobacco abuse    Hyperlipidemia        Past Medical History:   Diagnosis Date    History of transfusion 09/20/1964    Stroke 09/17/2023        Social History     Socioeconomic History    Marital status:    Tobacco Use    Smoking status: Former     Types: Cigars      "Start date: 9/16/1979    Smokeless tobacco: Former   Vaping Use    Vaping status: Never Used   Substance and Sexual Activity    Alcohol use: Yes     Alcohol/week: 1.0 standard drink of alcohol     Types: 1 Cans of beer per week    Drug use: Never    Sexual activity: Yes        No Known Allergies       Current Outpatient Medications:     Aspirin Low Dose 81 MG EC tablet, Take 1 tablet by mouth Daily., Disp: , Rfl:     atorvastatin (LIPITOR) 80 MG tablet, Take 1 tablet by mouth Every Night., Disp: 90 tablet, Rfl: 0    gabapentin (NEURONTIN) 100 MG capsule, Take 1 capsule by mouth 3 (Three) Times a Day., Disp: 90 capsule, Rfl: 3    losartan (COZAAR) 50 MG tablet, Take 1 tablet by mouth Daily for 90 days., Disp: 90 tablet, Rfl: 0    tiZANidine (ZANAFLEX) 4 MG tablet, Take 1 tablet by mouth Every 8 (Eight) Hours As Needed for Muscle Spasms., Disp: 30 tablet, Rfl: 0    predniSONE (DELTASONE) 10 MG (21) dose pack, Take  by mouth Take As Directed. Use as directed on package (Patient not taking: Reported on 5/17/2024), Disp: 21 each, Rfl: 0       Objective   Vital Signs:   /90   Pulse 76   Resp 20   Ht 180.3 cm (71\")   Wt 105 kg (231 lb 12.8 oz)   SpO2 96%   BMI 32.33 kg/m²       Physical Exam  Vitals and nursing note reviewed.   Constitutional:       Appearance: Normal appearance.   HENT:      Head: Normocephalic.   Eyes:      General: Lids are normal.      Extraocular Movements: Extraocular movements intact.      Pupils: Pupils are equal, round, and reactive to light.   Neck:      Vascular: No carotid bruit.   Cardiovascular:      Rate and Rhythm: Normal rate and regular rhythm.      Heart sounds: Normal heart sounds.   Pulmonary:      Effort: Pulmonary effort is normal. No respiratory distress.   Skin:     General: Skin is warm and dry.   Neurological:      Mental Status: He is alert.      Motor: Motor strength is normal.     Deep Tendon Reflexes: Reflexes are normal and symmetric.   Psychiatric:         " Speech: Speech normal.        Neurological Exam  Mental Status  Alert. Oriented to person, place, time and situation. Speech is normal. Language is fluent with no aphasia.    Cranial Nerves  CN II: Visual fields full to confrontation.  CN III, IV, VI: Extraocular movements intact bilaterally. Normal lids and orbits bilaterally. Pupils equal round and reactive to light bilaterally.  CN V: Facial sensation is normal.  CN VII: Full and symmetric facial movement.  CN IX, X: Palate elevates symmetrically. Normal gag reflex.  CN XI: Shoulder shrug strength is normal.  CN XII: Tongue midline without atrophy or fasciculations.    Motor  Normal muscle bulk throughout. No fasciculations present. Normal muscle tone. No abnormal involuntary movements. Strength is 5/5 throughout all four extremities.    Sensory  Light touch is normal in upper and lower extremities.     Reflexes  Deep tendon reflexes are 2+ and symmetric in all four extremities.    Gait  Casual gait is normal including stance, stride, and arm swing.      Result Review :   The following data was reviewed by: DAIANA Gunderson on 05/17/2024:  CMP          9/17/2023    11:00 9/18/2023    05:18 2/2/2024    09:30   CMP   Glucose 161  94  86    BUN 15  15  18    Creatinine 1.01  0.95  1.22    EGFR 84.6  91.1  67.5    Sodium 138  139  139    Potassium 4.2  4.2  4.3    Chloride 105  105  105    Calcium 9.2  9.4  8.9    Total Protein 7.3  7.6  7.2    Albumin 4.1  4.3  4.0    Globulin 3.2  3.3  3.2    Total Bilirubin 0.5  0.9  1.0    Alkaline Phosphatase 83  82  74    AST (SGOT) 13  12  20    ALT (SGPT) 10  11  25    Albumin/Globulin Ratio 1.3  1.3  1.3    BUN/Creatinine Ratio 14.9  15.8  14.8    Anion Gap 10.0  11.0  7.6      CBC w/diff          9/17/2023    11:00 9/18/2023    05:18   CBC w/Diff   WBC 6.96  7.10    RBC 5.14  5.36    Hemoglobin 15.7  16.4    Hematocrit 47.2  49.9    MCV 91.8  93.1    MCH 30.5  30.6    MCHC 33.3  32.9    RDW 11.9  12.3    Platelets 220   212    Neutrophil Rel % 67.5     Immature Granulocyte Rel % 0.3     Lymphocyte Rel % 22.1     Monocyte Rel % 8.8     Eosinophil Rel % 0.9     Basophil Rel % 0.4       Lipid Panel          9/18/2023    05:18 2/2/2024    09:30   Lipid Panel   Total Cholesterol 215  110    Triglycerides 139  111    HDL Cholesterol 29  33    VLDL Cholesterol 26  21    LDL Cholesterol  160  56    LDL/HDL Ratio 5.46  1.66        Most Recent A1C          2/2/2024    09:30   HGBA1C Most Recent   Hemoglobin A1C 6.00      Office Visit with Charisse Bella APRN (02/02/2024)     MRI Brain With & Without Contrast (09/17/2023 16:25)   Study Result    Narrative & Impression   HISTORY: Stroke follow-up     MRI BRAIN: Multiplanar imaging the brain performed pre- and post-IV  contrast.     COMPARISON: CT head 09/17/2023     FINDINGS: There is prominent susceptibility artifact associated with the  small metallic fragment in the scalp of the right forehead.     There is a 9 mm focus of diffusion restriction within the left thalamus  representing acute nonhemorrhagic ischemia of the left thalamus. No  additional diffusion restriction identified. No intracranial blood  products localized. Ventricles are normal in size and configuration. Old  lacunar infarct adjacent the left basal ganglia suspected. There is no  abnormal intracranial enhancement identified. Corpus callosum intact. No  sellar mass.     Small mucous retention cyst suspected of left maxillary sinus with mild  mucosal thickening of the abdomen sinuses. Limited assessment of the  orbits and base of skull is unremarkable.     IMPRESSION:  1. Acute nonhemorrhagic ischemia of the left thalamus. Findings called  to the nurse caring for the patient on the 3A leslie at 4:34 PM on  09/17/2023.  2. Old lacunar infarct adjacent the left basal ganglia.  3. Susceptibility artifact associated with a retained metallic fragment  within the soft tissues of the right forehead.  4. No abnormal intracranial  enhancement.  This report was finalized on 09/17/2023 16:34 by Dr. Kristine Palacios MD.                           Impression:  Des Silveira is a 63 y.o. male who presents for follow-up of ischemic stroke.  He did experience a stroke of the left thalamus.  Etiology felt to be small vessel related to hypertension and hyperlipidemia as well as tobacco use.  Patient has been experiencing numbness and burning pain of the right side.  Likely related to thalamic pain syndrome.  He has responded to gabapentin.  Would recommend continuing this.  Additionally recommend continuing secondary stroke preventative strategies.    Diagnoses and all orders for this visit:    1. History of stroke with residual deficit (Primary)    2. Right sided numbness    3. Hyperlipidemia LDL goal <70    4. History of tobacco use    5. Essential hypertension    6. Thalamic pain syndrome        Plan:  Continue aspirin 81 mg daily  Continue atorvastatin 80 mg nightly  Continue gabapentin 100 mg capsule 3 times daily  LDL goal less than 70.  Currently at goal with 56.  A1c goal less than 6.5.  Currently at goal with 6.0.  BP goal less than 130/80.  Currently 140/90 in office.  Return to the emergency room for any new stroke symptoms  Recommend Mediterranean-style diet  Recommend increasing activities as tolerated  Continue tobacco cessation  Recommend the patient to contact PCP to refill Zanaflex.  Neurology will not manage low back injuries.  Follow-up with PCP as scheduled  Follow-up in our clinic in 6 months or sooner if needed    The patient and I have discussed the plan of care and he is in full agreement at this time.     Follow Up   Return in about 6 months (around 11/17/2024) for Stroke.            Charisse Bella, DAIANA  05/17/24  09:06 CDT

## 2024-06-20 ENCOUNTER — TELEPHONE (OUTPATIENT)
Dept: NEUROLOGY | Facility: CLINIC | Age: 64
End: 2024-06-20
Payer: COMMERCIAL

## 2024-07-03 ENCOUNTER — APPOINTMENT (OUTPATIENT)
Dept: GENERAL RADIOLOGY | Facility: HOSPITAL | Age: 64
End: 2024-07-03
Payer: COMMERCIAL

## 2024-07-03 PROCEDURE — 74018 RADEX ABDOMEN 1 VIEW: CPT

## 2024-07-03 PROCEDURE — 71101 X-RAY EXAM UNILAT RIBS/CHEST: CPT

## 2024-09-06 ENCOUNTER — OFFICE VISIT (OUTPATIENT)
Dept: NEUROLOGY | Facility: CLINIC | Age: 64
End: 2024-09-06
Payer: COMMERCIAL

## 2024-09-06 VITALS
HEIGHT: 71 IN | SYSTOLIC BLOOD PRESSURE: 138 MMHG | HEART RATE: 78 BPM | RESPIRATION RATE: 18 BRPM | WEIGHT: 240 LBS | DIASTOLIC BLOOD PRESSURE: 72 MMHG | BODY MASS INDEX: 33.6 KG/M2

## 2024-09-06 DIAGNOSIS — I69.30 HISTORY OF STROKE WITH RESIDUAL DEFICIT: Primary | ICD-10-CM

## 2024-09-06 DIAGNOSIS — I10 ESSENTIAL HYPERTENSION: ICD-10-CM

## 2024-09-06 DIAGNOSIS — R20.0 RIGHT SIDED NUMBNESS: ICD-10-CM

## 2024-09-06 DIAGNOSIS — Z87.891 HISTORY OF TOBACCO USE: ICD-10-CM

## 2024-09-06 DIAGNOSIS — G89.0 THALAMIC PAIN SYNDROME: ICD-10-CM

## 2024-09-06 DIAGNOSIS — E78.5 HYPERLIPIDEMIA LDL GOAL <70: ICD-10-CM

## 2024-09-06 RX ORDER — LOSARTAN POTASSIUM 50 MG/1
50 TABLET ORAL DAILY
COMMUNITY

## 2024-09-06 NOTE — PROGRESS NOTES
Neurology Consult Note    [facility]  [address], [address]  [place] [address]  Phone: [phone]  Fax: [phone]    Referring Provider:   No ref. provider found  Primary Care Provider:  Andrea Sutherland PA-C    Reason for Consult:  History of stroke  Subjective        Des Silveira presents to [facility]    History of Present Illness  64-year-old male seen for follow-up of stroke.  Last seen in clinic on 5/17/2024.  It is noted approximately September 2023 patient did experience an acute ischemic infarction involving the left thalamus.  During last encounter he continue patient on aspirin and atorvastatin.  Also recommend patient to continue gabapentin 100 mg 3 times daily for stomach pain syndrome.  Also recommend routine secondary stroke prevention strategies.    Today patient presents with his spouse.  Reports me no new stroke symptoms since last being seen.  He continues medications as prescribed.  He notes gabapentin does help with the burning sensation he was having on the right side.    Patient Active Problem List   Diagnosis    Stroke    Cerebrovascular accident (CVA) of left thalamus    Sequela of lacunar infarction    Elevated blood pressure reading    Tobacco abuse    Hyperlipidemia        Past Medical History:   Diagnosis Date    History of transfusion 09/20/1964    Stroke 09/17/2023        Social History     Socioeconomic History    Marital status:    Tobacco Use    Smoking status: Former     Types: Cigars     Start date: 9/16/1979    Smokeless tobacco: Former   Vaping Use    Vaping status: Never Used   Substance and Sexual Activity    Alcohol use: Yes     Alcohol/week: 1.0 standard drink of alcohol     Types: 1 Cans of beer per week    Drug use: Never    Sexual activity: Yes        No Known Allergies       Current Outpatient Medications:     Aspirin Low Dose 81 MG EC tablet, Take 1 tablet by mouth Daily., Disp: , Rfl:      "atorvastatin (LIPITOR) 80 MG tablet, Take 1 tablet by mouth Every Night., Disp: 90 tablet, Rfl: 0    cyclobenzaprine (FLEXERIL) 5 MG tablet, Take 1 tablet by mouth 3 (Three) Times a Day As Needed for Muscle Spasms., Disp: 15 tablet, Rfl: 0    gabapentin (NEURONTIN) 100 MG capsule, Take 1 capsule by mouth 3 (Three) Times a Day., Disp: 90 capsule, Rfl: 3    losartan (COZAAR) 50 MG tablet, Take 1 tablet by mouth Daily., Disp: , Rfl:     losartan (COZAAR) 50 MG tablet, Take 1 tablet by mouth Daily for 90 days., Disp: 90 tablet, Rfl: 0       Objective   Vital Signs:   /72 (BP Location: Left arm, Patient Position: Sitting)   Pulse 78   Resp 18   Ht 180.3 cm (71\")   Wt 109 kg (240 lb)   BMI 33.47 kg/m²       Physical Exam  Vitals and nursing note reviewed.   Constitutional:       Appearance: Normal appearance.   HENT:      Head: Normocephalic.   Eyes:      General: Lids are normal.      Extraocular Movements: Extraocular movements intact.      Pupils: Pupils are equal, round, and reactive to light.   Cardiovascular:      Rate and Rhythm: Normal rate and regular rhythm.      Heart sounds: Normal heart sounds.   Pulmonary:      Effort: Pulmonary effort is normal.      Breath sounds: Normal breath sounds.   Skin:     General: Skin is warm and dry.   Neurological:      Mental Status: He is alert.      Motor: Motor strength is normal.     Deep Tendon Reflexes: Reflexes are normal and symmetric.   Psychiatric:         Speech: Speech normal.        Neurological Exam  Mental Status  Alert. Oriented to person, place, time and situation. Speech is normal. Language is fluent with no aphasia.    Cranial Nerves  CN II: Visual fields full to confrontation.  CN III, IV, VI: Extraocular movements intact bilaterally. Normal lids and orbits bilaterally. Pupils equal round and reactive to light bilaterally.  CN V: Facial sensation is normal.  CN VII: Full and symmetric facial movement.  CN IX, X: Palate elevates symmetrically. " Normal gag reflex.  CN XI: Shoulder shrug strength is normal.  CN XII: Tongue midline without atrophy or fasciculations.    Motor  Normal muscle bulk throughout. No fasciculations present. Normal muscle tone. No abnormal involuntary movements. Strength is 5/5 throughout all four extremities.    Sensory  Light touch abnormality: Decreased sensation to right arm.     Reflexes  Deep tendon reflexes are 2+ and symmetric in all four extremities.    Coordination  Right: Finger-to-nose normal.Left: Finger-to-nose normal.    Gait  Casual gait is normal including stance, stride, and arm swing.      Result Review :   The following data was reviewed by: DAIANA Gunderson on 09/06/2024:  CMP          9/17/2023    11:00 9/18/2023    05:18 2/2/2024    09:30   CMP   Glucose 161  94  86    BUN 15  15  18    Creatinine 1.01  0.95  1.22    EGFR 84.6  91.1  67.5    Sodium 138  139  139    Potassium 4.2  4.2  4.3    Chloride 105  105  105    Calcium 9.2  9.4  8.9    Total Protein 7.3  7.6  7.2    Albumin 4.1  4.3  4.0    Globulin 3.2  3.3  3.2    Total Bilirubin 0.5  0.9  1.0    Alkaline Phosphatase 83  82  74    AST (SGOT) 13  12  20    ALT (SGPT) 10  11  25    Albumin/Globulin Ratio 1.3  1.3  1.3    BUN/Creatinine Ratio 14.9  15.8  14.8    Anion Gap 10.0  11.0  7.6      CBC w/diff          9/17/2023    11:00 9/18/2023    05:18   CBC w/Diff   WBC 6.96  7.10    RBC 5.14  5.36    Hemoglobin 15.7  16.4    Hematocrit 47.2  49.9    MCV 91.8  93.1    MCH 30.5  30.6    MCHC 33.3  32.9    RDW 11.9  12.3    Platelets 220  212    Neutrophil Rel % 67.5     Immature Granulocyte Rel % 0.3     Lymphocyte Rel % 22.1     Monocyte Rel % 8.8     Eosinophil Rel % 0.9     Basophil Rel % 0.4       Lipid Panel          9/18/2023    05:18 2/2/2024    09:30   Lipid Panel   Total Cholesterol 215  110    Triglycerides 139  111    HDL Cholesterol 29  33    VLDL Cholesterol 26  21    LDL Cholesterol  160  56    LDL/HDL Ratio 5.46  1.66        Most Recent  A1C          2/2/2024    09:30   HGBA1C Most Recent   Hemoglobin A1C 6.00         Progress Notes by Charisse Bella APRN (05/17/2024 09:00)                  Impression:  Des Silveira is a 64 y.o. male who presents for follow-up of stroke.  Patient did experience a left thymic infarction in September 2023.  Etiology felt to be small vessel disease related to uncontrolled hypertension, hyperlipidemia and tobacco abuse.  Since his stroke he has done well in recovery.  He does continue with some residual sensory deficits involving the right arm more than the right leg.  He was having some burning sensations of the right side.  Etiology felt to be related to the thalamus stroke.  We did start patient on low-dose gabapentin.  Patient has had good benefit with this.  At this point I would recommend patient to continue secondary stroke preventative strategies through his primary care's office.  No further indication for neurological workup.    Of note, I did call and discussed case with WALLACE Jaimes with Dr. Ayad Davies.  They are in agreements to  gabapentin prescription.    Diagnoses and all orders for this visit:    1. History of stroke with residual deficit (Primary)    2. Right sided numbness    3. Hyperlipidemia LDL goal <70    4. History of tobacco use    5. Essential hypertension    6. Thalamic pain syndrome        Plan:  Continue aspirin 81 mg daily  Continue atorvastatin 80 mg nightly  Continue gabapentin 100 mg, 1 tablet 3 times daily  LDL goal less than 70  A1c goal less than 6.5  BP goal less than 130/80  Continue tobacco cessation  Mediterranean-style diet  Increase activities as tolerated  Return to the emergency room for any new stroke symptoms  Follow-up with primary care as scheduled  Follow-up in my clinic as needed.  Primary care may reach out to our clinic at any time if they need any assistance with his care.    The patient and I have discussed the plan of care and he is in full agreement  at this time.     Follow Up   Return if symptoms worsen or fail to improve, for Stroke/TIA.            Charisse Bella, DAIANA  09/06/24  09:00 CDT

## (undated) DEVICE — FORCEPS BX L240CM DIA2.4MM L NDL RAD JAW 4 133340

## (undated) DEVICE — ENDO KIT,LOURDES HOSPITAL: Brand: MEDLINE INDUSTRIES, INC.

## (undated) DEVICE — TRAP POLYP ETRAP

## (undated) DEVICE — FORCEPS BX 240CM 2.4MM L NDL RAD JAW 4 M00513334